# Patient Record
Sex: MALE | Race: WHITE | NOT HISPANIC OR LATINO | Employment: FULL TIME | URBAN - NONMETROPOLITAN AREA
[De-identification: names, ages, dates, MRNs, and addresses within clinical notes are randomized per-mention and may not be internally consistent; named-entity substitution may affect disease eponyms.]

---

## 2017-08-30 ENCOUNTER — HOSPITAL ENCOUNTER (EMERGENCY)
Facility: HOSPITAL | Age: 47
Discharge: HOME | End: 2017-08-30
Attending: EMERGENCY MEDICINE | Admitting: EMERGENCY MEDICINE

## 2017-08-30 ENCOUNTER — APPOINTMENT (OUTPATIENT)
Dept: CT IMAGING | Facility: HOSPITAL | Age: 47
End: 2017-08-30

## 2017-08-30 VITALS
HEART RATE: 80 BPM | WEIGHT: 270 LBS | DIASTOLIC BLOOD PRESSURE: 72 MMHG | TEMPERATURE: 97.5 F | BODY MASS INDEX: 36.57 KG/M2 | HEIGHT: 72 IN | RESPIRATION RATE: 18 BRPM | SYSTOLIC BLOOD PRESSURE: 114 MMHG | OXYGEN SATURATION: 97 %

## 2017-08-30 DIAGNOSIS — R74.8 ELEVATED LIPASE: ICD-10-CM

## 2017-08-30 DIAGNOSIS — R31.9 HEMATURIA: ICD-10-CM

## 2017-08-30 DIAGNOSIS — N20.0 KIDNEY STONE ON LEFT SIDE: Primary | ICD-10-CM

## 2017-08-30 LAB
ALBUMIN SERPL-MCNC: 4.3 G/DL (ref 3.5–5)
ALBUMIN/GLOB SERPL: 1.5 G/DL (ref 1–2)
ALP SERPL-CCNC: 88 U/L (ref 38–126)
ALT SERPL W P-5'-P-CCNC: 47 U/L (ref 13–69)
ANION GAP SERPL CALCULATED.3IONS-SCNC: 15.2 MMOL/L
AST SERPL-CCNC: 24 U/L (ref 15–46)
BACTERIA UR QL AUTO: ABNORMAL /HPF
BASOPHILS # BLD AUTO: 0.13 10*3/MM3 (ref 0–0.2)
BASOPHILS NFR BLD AUTO: 1.3 % (ref 0–2.5)
BILIRUB SERPL-MCNC: 0.5 MG/DL (ref 0.2–1.3)
BILIRUB UR QL STRIP: NEGATIVE
BUN BLD-MCNC: 15 MG/DL (ref 7–20)
BUN/CREAT SERPL: 13.6 (ref 6.3–21.9)
CALCIUM SPEC-SCNC: 9.4 MG/DL (ref 8.4–10.2)
CHLORIDE SERPL-SCNC: 105 MMOL/L (ref 98–107)
CLARITY UR: ABNORMAL
CO2 SERPL-SCNC: 28 MMOL/L (ref 26–30)
COLOR UR: YELLOW
CREAT BLD-MCNC: 1.1 MG/DL (ref 0.6–1.3)
DEPRECATED RDW RBC AUTO: 44.1 FL (ref 37–54)
EOSINOPHIL # BLD AUTO: 0.18 10*3/MM3 (ref 0–0.7)
EOSINOPHIL NFR BLD AUTO: 1.8 % (ref 0–7)
ERYTHROCYTE [DISTWIDTH] IN BLOOD BY AUTOMATED COUNT: 13.7 % (ref 11.5–14.5)
GFR SERPL CREATININE-BSD FRML MDRD: 72 ML/MIN/1.73
GLOBULIN UR ELPH-MCNC: 2.8 GM/DL
GLUCOSE BLD-MCNC: 146 MG/DL (ref 74–98)
GLUCOSE UR STRIP-MCNC: NEGATIVE MG/DL
HCT VFR BLD AUTO: 44.9 % (ref 42–52)
HGB BLD-MCNC: 14.6 G/DL (ref 14–18)
HGB UR QL STRIP.AUTO: ABNORMAL
HOLD SPECIMEN: NORMAL
HOLD SPECIMEN: NORMAL
IMM GRANULOCYTES # BLD: 0.05 10*3/MM3 (ref 0–0.06)
IMM GRANULOCYTES NFR BLD: 0.5 % (ref 0–0.6)
KETONES UR QL STRIP: NEGATIVE
LEUKOCYTE ESTERASE UR QL STRIP.AUTO: NEGATIVE
LIPASE SERPL-CCNC: 2262 U/L (ref 23–300)
LYMPHOCYTES # BLD AUTO: 2.16 10*3/MM3 (ref 0.6–3.4)
LYMPHOCYTES NFR BLD AUTO: 21.4 % (ref 10–50)
MCH RBC QN AUTO: 28.6 PG (ref 27–31)
MCHC RBC AUTO-ENTMCNC: 32.5 G/DL (ref 30–37)
MCV RBC AUTO: 88 FL (ref 80–94)
MONOCYTES # BLD AUTO: 0.69 10*3/MM3 (ref 0–0.9)
MONOCYTES NFR BLD AUTO: 6.8 % (ref 0–12)
NEUTROPHILS # BLD AUTO: 6.89 10*3/MM3 (ref 2–6.9)
NEUTROPHILS NFR BLD AUTO: 68.2 % (ref 37–80)
NITRITE UR QL STRIP: NEGATIVE
NRBC BLD MANUAL-RTO: 0 /100 WBC (ref 0–0)
PH UR STRIP.AUTO: 5.5 [PH] (ref 5–8)
PLATELET # BLD AUTO: 278 10*3/MM3 (ref 130–400)
PMV BLD AUTO: 11.4 FL (ref 6–12)
POTASSIUM BLD-SCNC: 4.2 MMOL/L (ref 3.5–5.1)
PROT SERPL-MCNC: 7.1 G/DL (ref 6.3–8.2)
PROT UR QL STRIP: ABNORMAL
RBC # BLD AUTO: 5.1 10*6/MM3 (ref 4.7–6.1)
RBC # UR: ABNORMAL /HPF
REF LAB TEST METHOD: ABNORMAL
SODIUM BLD-SCNC: 144 MMOL/L (ref 137–145)
SP GR UR STRIP: 1.02 (ref 1–1.03)
SQUAMOUS #/AREA URNS HPF: ABNORMAL /HPF
UROBILINOGEN UR QL STRIP: ABNORMAL
WBC NRBC COR # BLD: 10.1 10*3/MM3 (ref 4.8–10.8)
WBC UR QL AUTO: ABNORMAL /HPF
WHOLE BLOOD HOLD SPECIMEN: NORMAL
WHOLE BLOOD HOLD SPECIMEN: NORMAL

## 2017-08-30 PROCEDURE — 99283 EMERGENCY DEPT VISIT LOW MDM: CPT

## 2017-08-30 PROCEDURE — 96375 TX/PRO/DX INJ NEW DRUG ADDON: CPT

## 2017-08-30 PROCEDURE — 81001 URINALYSIS AUTO W/SCOPE: CPT | Performed by: EMERGENCY MEDICINE

## 2017-08-30 PROCEDURE — 80053 COMPREHEN METABOLIC PANEL: CPT | Performed by: EMERGENCY MEDICINE

## 2017-08-30 PROCEDURE — 25010000002 MORPHINE PER 10 MG: Performed by: EMERGENCY MEDICINE

## 2017-08-30 PROCEDURE — 96361 HYDRATE IV INFUSION ADD-ON: CPT

## 2017-08-30 PROCEDURE — 25010000002 KETOROLAC TROMETHAMINE PER 15 MG: Performed by: PHYSICIAN ASSISTANT

## 2017-08-30 PROCEDURE — 0 IOPAMIDOL 61 % SOLUTION: Performed by: EMERGENCY MEDICINE

## 2017-08-30 PROCEDURE — 74177 CT ABD & PELVIS W/CONTRAST: CPT

## 2017-08-30 PROCEDURE — 85025 COMPLETE CBC W/AUTO DIFF WBC: CPT | Performed by: EMERGENCY MEDICINE

## 2017-08-30 PROCEDURE — 83690 ASSAY OF LIPASE: CPT | Performed by: EMERGENCY MEDICINE

## 2017-08-30 PROCEDURE — 96374 THER/PROPH/DIAG INJ IV PUSH: CPT

## 2017-08-30 PROCEDURE — 25010000002 ONDANSETRON PER 1 MG

## 2017-08-30 RX ORDER — SODIUM CHLORIDE 0.9 % (FLUSH) 0.9 %
10 SYRINGE (ML) INJECTION AS NEEDED
Status: DISCONTINUED | OUTPATIENT
Start: 2017-08-30 | End: 2017-08-30 | Stop reason: HOSPADM

## 2017-08-30 RX ORDER — KETOROLAC TROMETHAMINE 30 MG/ML
30 INJECTION, SOLUTION INTRAMUSCULAR; INTRAVENOUS ONCE
Status: COMPLETED | OUTPATIENT
Start: 2017-08-30 | End: 2017-08-30

## 2017-08-30 RX ORDER — ATORVASTATIN CALCIUM 10 MG/1
10 TABLET, FILM COATED ORAL DAILY
COMMUNITY

## 2017-08-30 RX ORDER — ASPIRIN 81 MG/1
81 TABLET, CHEWABLE ORAL DAILY
COMMUNITY
End: 2022-04-13

## 2017-08-30 RX ORDER — ONDANSETRON 4 MG/1
4 TABLET, FILM COATED ORAL EVERY 6 HOURS PRN
Qty: 12 TABLET | Refills: 0 | Status: SHIPPED | OUTPATIENT
Start: 2017-08-30 | End: 2019-05-21

## 2017-08-30 RX ORDER — MORPHINE SULFATE 4 MG/ML
4 INJECTION, SOLUTION INTRAMUSCULAR; INTRAVENOUS ONCE
Status: COMPLETED | OUTPATIENT
Start: 2017-08-30 | End: 2017-08-30

## 2017-08-30 RX ORDER — TAMSULOSIN HYDROCHLORIDE 0.4 MG/1
1 CAPSULE ORAL NIGHTLY
Qty: 10 CAPSULE | Refills: 0 | Status: SHIPPED | OUTPATIENT
Start: 2017-08-30 | End: 2019-05-21

## 2017-08-30 RX ORDER — OXYCODONE HYDROCHLORIDE AND ACETAMINOPHEN 5; 325 MG/1; MG/1
1 TABLET ORAL EVERY 4 HOURS PRN
Qty: 12 TABLET | Refills: 0 | OUTPATIENT
Start: 2017-08-30 | End: 2019-02-27 | Stop reason: HOSPADM

## 2017-08-30 RX ORDER — ONDANSETRON 2 MG/ML
4 INJECTION INTRAMUSCULAR; INTRAVENOUS ONCE
Status: COMPLETED | OUTPATIENT
Start: 2017-08-30 | End: 2017-08-30

## 2017-08-30 RX ADMIN — IOPAMIDOL 100 ML: 612 INJECTION, SOLUTION INTRAVENOUS at 11:34

## 2017-08-30 RX ADMIN — MORPHINE SULFATE 4 MG: 4 INJECTION, SOLUTION INTRAMUSCULAR; INTRAVENOUS at 11:47

## 2017-08-30 RX ADMIN — SODIUM CHLORIDE 1000 ML: 9 INJECTION, SOLUTION INTRAVENOUS at 09:33

## 2017-08-30 RX ADMIN — KETOROLAC TROMETHAMINE 30 MG: 30 INJECTION, SOLUTION INTRAMUSCULAR at 10:14

## 2017-08-30 RX ADMIN — ONDANSETRON 4 MG: 2 INJECTION INTRAMUSCULAR; INTRAVENOUS at 09:33

## 2017-09-27 ENCOUNTER — HOSPITAL ENCOUNTER (OUTPATIENT)
Dept: GENERAL RADIOLOGY | Facility: HOSPITAL | Age: 47
Discharge: HOME | End: 2017-09-27
Admitting: NURSE PRACTITIONER

## 2017-09-27 ENCOUNTER — TRANSCRIBE ORDERS (OUTPATIENT)
Dept: ADMINISTRATIVE | Facility: HOSPITAL | Age: 47
End: 2017-09-27

## 2017-09-27 DIAGNOSIS — R05.9 COUGH: Primary | ICD-10-CM

## 2017-09-27 PROCEDURE — 71020 HC CHEST PA AND LATERAL: CPT

## 2017-10-02 ENCOUNTER — HOSPITAL ENCOUNTER (EMERGENCY)
Facility: HOSPITAL | Age: 47
Discharge: HOME | End: 2017-10-02
Attending: EMERGENCY MEDICINE | Admitting: EMERGENCY MEDICINE

## 2017-10-02 VITALS
RESPIRATION RATE: 16 BRPM | HEIGHT: 72 IN | OXYGEN SATURATION: 96 % | SYSTOLIC BLOOD PRESSURE: 130 MMHG | TEMPERATURE: 98.2 F | BODY MASS INDEX: 35.21 KG/M2 | WEIGHT: 260 LBS | DIASTOLIC BLOOD PRESSURE: 80 MMHG | HEART RATE: 95 BPM

## 2017-10-02 DIAGNOSIS — Z72.0 TOBACCO ABUSE: ICD-10-CM

## 2017-10-02 DIAGNOSIS — J06.9 VIRAL URI WITH COUGH: Primary | ICD-10-CM

## 2017-10-02 LAB
FLUAV AG NPH QL: NEGATIVE
FLUBV AG NPH QL IA: NEGATIVE

## 2017-10-02 PROCEDURE — 99283 EMERGENCY DEPT VISIT LOW MDM: CPT

## 2017-10-02 PROCEDURE — 87804 INFLUENZA ASSAY W/OPTIC: CPT | Performed by: EMERGENCY MEDICINE

## 2017-10-02 NOTE — ED PROVIDER NOTES
Subjective   History of Present Illness  TRIAGE CHIEF COMPLAINT:   Chief Complaint   Patient presents with   • Flu Symptoms         HPI: Rupert Briscoe   is a 46 y.o. male   who presents to the emergency department complaining of Flulike illness.  Patient with history of hypertension, diabetes, dyslipidemia, active smoker.  Describes 7-8 days of flulike illness with body aches, nasal congestion, intermittent cough, fatigue.  Had outpatient chest x-ray and flu swab earlier this week which were unremarkable.  He finished a course of azithromycin yesterday.  Patient states he does not feel adequately improved.  Denies any nausea, vomiting, diarrhea, or measured fever within the past 24 hours.  Patient states his glucose has been well-controlled.           Review of Systems   All other systems reviewed and are negative.      Past Medical History:   Diagnosis Date   • Diabetes mellitus    • Hyperlipidemia    • Hypertension        No Known Allergies    Past Surgical History:   Procedure Laterality Date   • APPENDECTOMY     • CHOLECYSTECTOMY     • COLON SURGERY     • HEMORRHOIDECTOMY     • HERNIA REPAIR     • MANDIBLE FRACTURE SURGERY     • TONSILLECTOMY         History reviewed. No pertinent family history.    Social History     Social History   • Marital status: Single     Spouse name: N/A   • Number of children: N/A   • Years of education: N/A     Social History Main Topics   • Smoking status: Current Every Day Smoker     Packs/day: 0.50   • Smokeless tobacco: None   • Alcohol use No   • Drug use: No   • Sexual activity: Defer     Other Topics Concern   • None     Social History Narrative   • None           Objective   Physical Exam    CONSTITUTIONAL: Awake, oriented, appears non-toxic   HENT: Atraumatic, normocephalic, oral mucosa pink and moist, airway patent. Nares patent without drainage. External ears normal.   EYES: Conjunctiva clear, EOMI, PERRL   NECK: Trachea midline, non-tender, supple   CARDIOVASCULAR:  Normal heart rate 84, Normal rhythm, No murmurs, rubs, gallops   PULMONARY/CHEST: Clear to auscultation, no rhonchi, wheezes, or rales. Symmetrical breath sounds. Non-tender.  No coughing observed  ABDOMINAL: Non-distended, soft, non-tender - no rebound or guarding. BS normal.   NEUROLOGIC: Non-focal, moving all four extremities, no gross sensory or motor deficits.   EXTREMITIES: No clubbing, cyanosis, or edema   SKIN: Warm, Dry, No erythema, No rash     No orders to display           EKG:         Procedures         ED Course  ED Course          ED COURSE / MEDICAL DECISION MAKING:   Nursing notes reviewed.    Patient is well-appearing, moving good air without wheezing, observed coughing, rhonchi.  He is also afebrile.  Overall symptoms are most suggestive of a viral URI and I suspect this is why he did not respond to azithromycin.  Plan for supportive measures, encouraged to quit smoking, outpatient PCP follow-up versus return if worse    DECISION TO DISCHARGE/ADMIT: see ED care timeline       Electronically signed by: Vladimir Donohue MD, 10/2/2017 9:33 AM              [facility]    Final diagnoses:   Viral URI with cough   Tobacco abuse            Vladimir Donohue MD  10/02/17 0934

## 2017-11-27 ENCOUNTER — APPOINTMENT (OUTPATIENT)
Dept: CT IMAGING | Facility: HOSPITAL | Age: 47
End: 2017-11-27

## 2017-11-27 ENCOUNTER — APPOINTMENT (OUTPATIENT)
Dept: GENERAL RADIOLOGY | Facility: HOSPITAL | Age: 47
End: 2017-11-27

## 2017-11-27 ENCOUNTER — HOSPITAL ENCOUNTER (EMERGENCY)
Facility: HOSPITAL | Age: 47
Discharge: HOME | End: 2017-11-27
Attending: EMERGENCY MEDICINE | Admitting: EMERGENCY MEDICINE

## 2017-11-27 VITALS
HEART RATE: 69 BPM | SYSTOLIC BLOOD PRESSURE: 137 MMHG | WEIGHT: 260 LBS | HEIGHT: 72 IN | TEMPERATURE: 97.8 F | OXYGEN SATURATION: 98 % | RESPIRATION RATE: 20 BRPM | BODY MASS INDEX: 35.21 KG/M2 | DIASTOLIC BLOOD PRESSURE: 87 MMHG

## 2017-11-27 DIAGNOSIS — K59.00 CONSTIPATION, UNSPECIFIED CONSTIPATION TYPE: ICD-10-CM

## 2017-11-27 DIAGNOSIS — J06.9 UPPER RESPIRATORY TRACT INFECTION, UNSPECIFIED TYPE: ICD-10-CM

## 2017-11-27 DIAGNOSIS — R05.9 COUGH: Primary | ICD-10-CM

## 2017-11-27 LAB
ALBUMIN SERPL-MCNC: 4.1 G/DL (ref 3.5–5)
ALBUMIN/GLOB SERPL: 1.6 G/DL (ref 1–2)
ALP SERPL-CCNC: 75 U/L (ref 38–126)
ALT SERPL W P-5'-P-CCNC: 40 U/L (ref 13–69)
ANION GAP SERPL CALCULATED.3IONS-SCNC: 15.2 MMOL/L
AST SERPL-CCNC: 20 U/L (ref 15–46)
BASOPHILS # BLD AUTO: 0.12 10*3/MM3 (ref 0–0.2)
BASOPHILS NFR BLD AUTO: 1.5 % (ref 0–2.5)
BILIRUB SERPL-MCNC: 0.2 MG/DL (ref 0.2–1.3)
BUN BLD-MCNC: 17 MG/DL (ref 7–20)
BUN/CREAT SERPL: 17 (ref 6.3–21.9)
CALCIUM SPEC-SCNC: 9.1 MG/DL (ref 8.4–10.2)
CHLORIDE SERPL-SCNC: 103 MMOL/L (ref 98–107)
CO2 SERPL-SCNC: 28 MMOL/L (ref 26–30)
CREAT BLD-MCNC: 1 MG/DL (ref 0.6–1.3)
DEPRECATED RDW RBC AUTO: 44.1 FL (ref 37–54)
EOSINOPHIL # BLD AUTO: 0.36 10*3/MM3 (ref 0–0.7)
EOSINOPHIL NFR BLD AUTO: 4.6 % (ref 0–7)
ERYTHROCYTE [DISTWIDTH] IN BLOOD BY AUTOMATED COUNT: 13.4 % (ref 11.5–14.5)
FLUAV AG NPH QL: NEGATIVE
FLUBV AG NPH QL IA: NEGATIVE
GFR SERPL CREATININE-BSD FRML MDRD: 80 ML/MIN/1.73
GLOBULIN UR ELPH-MCNC: 2.6 GM/DL
GLUCOSE BLD-MCNC: 153 MG/DL (ref 74–98)
HCT VFR BLD AUTO: 43.9 % (ref 42–52)
HGB BLD-MCNC: 13.8 G/DL (ref 14–18)
IMM GRANULOCYTES # BLD: 0.03 10*3/MM3 (ref 0–0.06)
IMM GRANULOCYTES NFR BLD: 0.4 % (ref 0–0.6)
LYMPHOCYTES # BLD AUTO: 2.37 10*3/MM3 (ref 0.6–3.4)
LYMPHOCYTES NFR BLD AUTO: 30.5 % (ref 10–50)
MCH RBC QN AUTO: 28.4 PG (ref 27–31)
MCHC RBC AUTO-ENTMCNC: 31.4 G/DL (ref 30–37)
MCV RBC AUTO: 90.3 FL (ref 80–94)
MONOCYTES # BLD AUTO: 0.71 10*3/MM3 (ref 0–0.9)
MONOCYTES NFR BLD AUTO: 9.1 % (ref 0–12)
NEUTROPHILS # BLD AUTO: 4.19 10*3/MM3 (ref 2–6.9)
NEUTROPHILS NFR BLD AUTO: 53.9 % (ref 37–80)
NRBC BLD MANUAL-RTO: 0 /100 WBC (ref 0–0)
NT-PROBNP SERPL-MCNC: 29.9 PG/ML (ref 0–125)
PLATELET # BLD AUTO: 240 10*3/MM3 (ref 130–400)
PMV BLD AUTO: 10.7 FL (ref 6–12)
POTASSIUM BLD-SCNC: 4.2 MMOL/L (ref 3.5–5.1)
PROT SERPL-MCNC: 6.7 G/DL (ref 6.3–8.2)
RBC # BLD AUTO: 4.86 10*6/MM3 (ref 4.7–6.1)
SODIUM BLD-SCNC: 142 MMOL/L (ref 137–145)
TROPONIN I SERPL-MCNC: <0.012 NG/ML (ref 0–0.03)
WBC NRBC COR # BLD: 7.78 10*3/MM3 (ref 4.8–10.8)

## 2017-11-27 PROCEDURE — 80053 COMPREHEN METABOLIC PANEL: CPT | Performed by: EMERGENCY MEDICINE

## 2017-11-27 PROCEDURE — 93005 ELECTROCARDIOGRAM TRACING: CPT | Performed by: EMERGENCY MEDICINE

## 2017-11-27 PROCEDURE — 85025 COMPLETE CBC W/AUTO DIFF WBC: CPT | Performed by: EMERGENCY MEDICINE

## 2017-11-27 PROCEDURE — 0 IOPAMIDOL 61 % SOLUTION: Performed by: EMERGENCY MEDICINE

## 2017-11-27 PROCEDURE — 71020 HC CHEST PA AND LATERAL: CPT

## 2017-11-27 PROCEDURE — 96374 THER/PROPH/DIAG INJ IV PUSH: CPT

## 2017-11-27 PROCEDURE — 25010000002 KETOROLAC TROMETHAMINE PER 15 MG: Performed by: EMERGENCY MEDICINE

## 2017-11-27 PROCEDURE — 83880 ASSAY OF NATRIURETIC PEPTIDE: CPT | Performed by: EMERGENCY MEDICINE

## 2017-11-27 PROCEDURE — 96361 HYDRATE IV INFUSION ADD-ON: CPT

## 2017-11-27 PROCEDURE — 99284 EMERGENCY DEPT VISIT MOD MDM: CPT

## 2017-11-27 PROCEDURE — 74177 CT ABD & PELVIS W/CONTRAST: CPT

## 2017-11-27 PROCEDURE — 84484 ASSAY OF TROPONIN QUANT: CPT | Performed by: EMERGENCY MEDICINE

## 2017-11-27 PROCEDURE — 87804 INFLUENZA ASSAY W/OPTIC: CPT | Performed by: EMERGENCY MEDICINE

## 2017-11-27 RX ORDER — BENZONATATE 100 MG/1
100 CAPSULE ORAL 3 TIMES DAILY PRN
Qty: 20 CAPSULE | Refills: 0 | OUTPATIENT
Start: 2017-11-27 | End: 2019-02-27 | Stop reason: HOSPADM

## 2017-11-27 RX ORDER — KETOROLAC TROMETHAMINE 30 MG/ML
30 INJECTION, SOLUTION INTRAMUSCULAR; INTRAVENOUS ONCE
Status: COMPLETED | OUTPATIENT
Start: 2017-11-27 | End: 2017-11-27

## 2017-11-27 RX ORDER — POLYETHYLENE GLYCOL 3350 17 G/17G
17 POWDER, FOR SOLUTION ORAL DAILY PRN
Qty: 30 EACH | Refills: 0 | OUTPATIENT
Start: 2017-11-27 | End: 2019-02-27 | Stop reason: HOSPADM

## 2017-11-27 RX ORDER — BENZONATATE 100 MG/1
100 CAPSULE ORAL ONCE
Status: COMPLETED | OUTPATIENT
Start: 2017-11-27 | End: 2017-11-27

## 2017-11-27 RX ADMIN — IOPAMIDOL 100 ML: 612 INJECTION, SOLUTION INTRAVENOUS at 06:41

## 2017-11-27 RX ADMIN — BENZONATATE 100 MG: 100 CAPSULE ORAL at 05:38

## 2017-11-27 RX ADMIN — KETOROLAC TROMETHAMINE 30 MG: 30 INJECTION, SOLUTION INTRAMUSCULAR at 05:36

## 2017-11-27 RX ADMIN — SODIUM CHLORIDE 1000 ML: 9 INJECTION, SOLUTION INTRAVENOUS at 05:34

## 2018-01-27 ENCOUNTER — HOSPITAL ENCOUNTER (EMERGENCY)
Facility: HOSPITAL | Age: 48
Discharge: HOME | End: 2018-01-27
Attending: EMERGENCY MEDICINE | Admitting: EMERGENCY MEDICINE

## 2018-01-27 VITALS
DIASTOLIC BLOOD PRESSURE: 95 MMHG | RESPIRATION RATE: 18 BRPM | SYSTOLIC BLOOD PRESSURE: 138 MMHG | OXYGEN SATURATION: 97 % | HEART RATE: 97 BPM | TEMPERATURE: 98.2 F | WEIGHT: 270 LBS | HEIGHT: 72 IN | BODY MASS INDEX: 36.57 KG/M2

## 2018-01-27 DIAGNOSIS — R53.83 OTHER FATIGUE: Primary | ICD-10-CM

## 2018-01-27 LAB
ALBUMIN SERPL-MCNC: 4.2 G/DL (ref 3.5–5)
ALBUMIN/GLOB SERPL: 1.7 G/DL (ref 1–2)
ALP SERPL-CCNC: 60 U/L (ref 38–126)
ALT SERPL W P-5'-P-CCNC: 39 U/L (ref 13–69)
ANION GAP SERPL CALCULATED.3IONS-SCNC: 13.4 MMOL/L
AST SERPL-CCNC: 21 U/L (ref 15–46)
BASOPHILS # BLD AUTO: 0.1 10*3/MM3 (ref 0–0.2)
BASOPHILS NFR BLD AUTO: 1.2 % (ref 0–2.5)
BILIRUB SERPL-MCNC: 0.2 MG/DL (ref 0.2–1.3)
BUN BLD-MCNC: 21 MG/DL (ref 7–20)
BUN/CREAT SERPL: 19.1 (ref 6.3–21.9)
CALCIUM SPEC-SCNC: 9.5 MG/DL (ref 8.4–10.2)
CHLORIDE SERPL-SCNC: 104 MMOL/L (ref 98–107)
CO2 SERPL-SCNC: 30 MMOL/L (ref 26–30)
CREAT BLD-MCNC: 1.1 MG/DL (ref 0.6–1.3)
DEPRECATED RDW RBC AUTO: 42.5 FL (ref 37–54)
EOSINOPHIL # BLD AUTO: 0.17 10*3/MM3 (ref 0–0.7)
EOSINOPHIL NFR BLD AUTO: 2 % (ref 0–7)
ERYTHROCYTE [DISTWIDTH] IN BLOOD BY AUTOMATED COUNT: 12.9 % (ref 11.5–14.5)
GFR SERPL CREATININE-BSD FRML MDRD: 71 ML/MIN/1.73
GLOBULIN UR ELPH-MCNC: 2.5 GM/DL
GLUCOSE BLD-MCNC: 103 MG/DL (ref 74–98)
HCT VFR BLD AUTO: 40 % (ref 42–52)
HGB BLD-MCNC: 13 G/DL (ref 14–18)
IMM GRANULOCYTES # BLD: 0.03 10*3/MM3 (ref 0–0.06)
IMM GRANULOCYTES NFR BLD: 0.3 % (ref 0–0.6)
LYMPHOCYTES # BLD AUTO: 2.85 10*3/MM3 (ref 0.6–3.4)
LYMPHOCYTES NFR BLD AUTO: 33.1 % (ref 10–50)
MCH RBC QN AUTO: 29.1 PG (ref 27–31)
MCHC RBC AUTO-ENTMCNC: 32.5 G/DL (ref 30–37)
MCV RBC AUTO: 89.5 FL (ref 80–94)
MONOCYTES # BLD AUTO: 0.67 10*3/MM3 (ref 0–0.9)
MONOCYTES NFR BLD AUTO: 7.8 % (ref 0–12)
NEUTROPHILS # BLD AUTO: 4.78 10*3/MM3 (ref 2–6.9)
NEUTROPHILS NFR BLD AUTO: 55.6 % (ref 37–80)
NRBC BLD MANUAL-RTO: 0 /100 WBC (ref 0–0)
PLATELET # BLD AUTO: 262 10*3/MM3 (ref 130–400)
PMV BLD AUTO: 9.7 FL (ref 6–12)
POTASSIUM BLD-SCNC: 4.4 MMOL/L (ref 3.5–5.1)
PROT SERPL-MCNC: 6.7 G/DL (ref 6.3–8.2)
RBC # BLD AUTO: 4.47 10*6/MM3 (ref 4.7–6.1)
SODIUM BLD-SCNC: 143 MMOL/L (ref 137–145)
TROPONIN I SERPL-MCNC: <0.012 NG/ML (ref 0–0.03)
WBC NRBC COR # BLD: 8.6 10*3/MM3 (ref 4.8–10.8)

## 2018-01-27 PROCEDURE — 84484 ASSAY OF TROPONIN QUANT: CPT | Performed by: PHYSICIAN ASSISTANT

## 2018-01-27 PROCEDURE — 93005 ELECTROCARDIOGRAM TRACING: CPT | Performed by: PHYSICIAN ASSISTANT

## 2018-01-27 PROCEDURE — 99283 EMERGENCY DEPT VISIT LOW MDM: CPT

## 2018-01-27 PROCEDURE — 80053 COMPREHEN METABOLIC PANEL: CPT | Performed by: PHYSICIAN ASSISTANT

## 2018-01-27 PROCEDURE — 36415 COLL VENOUS BLD VENIPUNCTURE: CPT

## 2018-01-27 PROCEDURE — 85025 COMPLETE CBC W/AUTO DIFF WBC: CPT | Performed by: PHYSICIAN ASSISTANT

## 2018-01-27 NOTE — ED PROVIDER NOTES
Subjective   HPI Comments: 47-year-old male in the ER with his wife the patient is complaining of generalized fatigue while at work today.  She has a history of diabetes and sleep apnea.  His wife states he is very noncompliant with his treatment.  He only takes his medications when he feels like it and misses up to a week at a time.  He also has a history of drinking viper energy drinks every morning and then 3-4 while at work. Has not had any today.  He denies any chest pain or difficulty breathing.  There is been no recent vomiting or diarrhea.  He denies any fever, cold symptoms.  He does have urinary frequency but does not have any complaints of dysuria or hematuria.  He is complaining of a numb sensation to both his legs.  He was able to walk into the ER unassisted.  Denies any back pain.  There is no history of coronary artery disease, he states he had a stress test that was normal about a year and a half ago.  He does not know his family history as he was adopted.  His wife reports he has an 11 pound weight gain over the last 2 months, he has started a new job that is sedentary.  His previous job he walked the entire shift.  His doctor is at [facility].      History provided by:  Patient and spouse  History limited by: no limits.   used: No        Review of Systems   Constitutional: Negative for activity change, appetite change, diaphoresis, fatigue and fever.   HENT: Negative for congestion, ear pain, rhinorrhea, sneezing and sore throat.    Eyes: Negative for pain, discharge and redness.   Respiratory: Negative for cough, shortness of breath and wheezing.    Cardiovascular: Negative.    Gastrointestinal: Negative for abdominal pain, constipation, diarrhea, nausea and vomiting.   Endocrine: Positive for polyuria.   Genitourinary: Negative for decreased urine volume, difficulty urinating, dysuria, frequency, hematuria and urgency.   Musculoskeletal: Negative for back pain and neck  pain.   Skin: Negative for color change, pallor and rash.   Allergic/Immunologic: Negative.    Neurological: Positive for numbness.   Hematological: Negative.    Psychiatric/Behavioral: Negative.    All other systems reviewed and are negative.      Past Medical History:   Diagnosis Date   • Diabetes mellitus    • Hyperlipidemia    • Hypertension        No Known Allergies    Past Surgical History:   Procedure Laterality Date   • APPENDECTOMY     • CHOLECYSTECTOMY     • COLON SURGERY     • HEMORRHOIDECTOMY     • HERNIA REPAIR     • MANDIBLE FRACTURE SURGERY     • TONSILLECTOMY         History reviewed. No pertinent family history.    Social History     Social History   • Marital status: Single     Spouse name: N/A   • Number of children: N/A   • Years of education: N/A     Social History Main Topics   • Smoking status: Current Every Day Smoker     Packs/day: 0.50   • Smokeless tobacco: None   • Alcohol use No   • Drug use: No   • Sexual activity: Defer     Other Topics Concern   • None     Social History Narrative           Objective   Physical Exam   Constitutional: He is oriented to person, place, and time. He appears well-developed and well-nourished. No distress.   HENT:   Head: Normocephalic and atraumatic.   Right Ear: External ear normal.   Left Ear: External ear normal.   Nose: Nose normal.   Mouth/Throat: Oropharynx is clear and moist. No oropharyngeal exudate.   Eyes: Conjunctivae and EOM are normal. Pupils are equal, round, and reactive to light. Right eye exhibits no discharge. Left eye exhibits no discharge. No scleral icterus.   Neck: Normal range of motion. Neck supple. No JVD present. No tracheal deviation present.   Cardiovascular: Normal rate, regular rhythm and normal heart sounds.    Pulmonary/Chest: Effort normal and breath sounds normal. No stridor. No respiratory distress. He has no wheezes. He has no rales.   Abdominal: Soft. Bowel sounds are normal. He exhibits no mass. There is no tenderness.  There is no rebound and no guarding. No hernia.   Musculoskeletal: Normal range of motion. He exhibits no edema, tenderness or deformity.   Lymphadenopathy:     He has no cervical adenopathy.   Neurological: He is alert and oriented to person, place, and time. No cranial nerve deficit. He exhibits normal muscle tone. Coordination normal.   Skin: Skin is warm and dry. No rash noted. He is not diaphoretic. No erythema. No pallor.   Psychiatric: He has a normal mood and affect. His behavior is normal. Judgment and thought content normal.   Nursing note and vitals reviewed.      ECG 12 Lead    Date/Time: 1/27/2018 8:04 PM  Performed by: CECE STEARNS  Authorized by: CECE STEARNS   Interpreted by physician  Comparison: compared with previous ECG from 11/27/2017  Rhythm: sinus rhythm  Rate: normal  QRS axis: normal  Conduction: conduction normal  ST Segments: ST segments normal  T Waves: T waves normal  Other: no other findings  Clinical impression: normal ECG               ED Course  ED Course   Comment By Time   CBC white count 8.6, Hemoglobin 13, hematocrit 40, platelets 262. Cece Stearns PA-C 01/27 2014   Pt states is feeling better and anxious to get home.  Discussed work up with him, exact cause of fatigue not revealed in labs, advised pt to see PCP on Monday for further evaluation.  QUESTIONS answered to his satisfaction, he is agreeable to the treatment plan and be discharged home. Cece Stearns PA-C 01/27 2035   Comprehensive metabolic panel, glucose 103, BUN 21, creatinine 1.1, sodium 143, potassium 4.4, chloride 104, CO2 30.  Troponin less than 0.012. Cece Stearns PA-C 01/27 2036                  [facility]    Final diagnoses:   Other fatigue            Cece Stearns PA-C  01/27/18 2247

## 2018-01-28 NOTE — DISCHARGE INSTRUCTIONS
Drink plenty of fluids.  Rest.  Take all of your medications as directed.  Call your primary care physician on Monday morning and arrange a follow-up appointment for further evaluation.  Return to the ER over the weekend for any chest pain, difficulty breathing, vomiting, abdominal pain or any other worrisome changes.

## 2019-02-19 ENCOUNTER — HOSPITAL ENCOUNTER (EMERGENCY)
Facility: HOSPITAL | Age: 49
Discharge: HOME | End: 2019-02-19
Attending: EMERGENCY MEDICINE | Admitting: EMERGENCY MEDICINE

## 2019-02-19 ENCOUNTER — APPOINTMENT (OUTPATIENT)
Dept: CT IMAGING | Facility: HOSPITAL | Age: 49
End: 2019-02-19

## 2019-02-19 VITALS
TEMPERATURE: 97.8 F | SYSTOLIC BLOOD PRESSURE: 124 MMHG | HEART RATE: 71 BPM | OXYGEN SATURATION: 99 % | RESPIRATION RATE: 16 BRPM | WEIGHT: 259.4 LBS | DIASTOLIC BLOOD PRESSURE: 80 MMHG | HEIGHT: 72 IN | BODY MASS INDEX: 35.13 KG/M2

## 2019-02-19 DIAGNOSIS — R10.10 PAIN OF UPPER ABDOMEN: Primary | ICD-10-CM

## 2019-02-19 DIAGNOSIS — R74.8 ELEVATED LIPASE: ICD-10-CM

## 2019-02-19 LAB
ALBUMIN SERPL-MCNC: 3.8 G/DL (ref 3.5–5)
ALBUMIN/GLOB SERPL: 1.7 G/DL (ref 1–2)
ALP SERPL-CCNC: 63 U/L (ref 38–126)
ALT SERPL W P-5'-P-CCNC: 38 U/L (ref 13–69)
ANION GAP SERPL CALCULATED.3IONS-SCNC: 7.9 MMOL/L (ref 10–20)
AST SERPL-CCNC: 22 U/L (ref 15–46)
BASOPHILS # BLD AUTO: 0.14 10*3/MM3 (ref 0–0.2)
BASOPHILS NFR BLD AUTO: 1.6 % (ref 0–2.5)
BILIRUB SERPL-MCNC: 0.2 MG/DL (ref 0.2–1.3)
BILIRUB UR QL STRIP: NEGATIVE
BUN BLD-MCNC: 19 MG/DL (ref 7–20)
BUN/CREAT SERPL: 17.3 (ref 6.3–21.9)
CALCIUM SPEC-SCNC: 8.8 MG/DL (ref 8.4–10.2)
CHLORIDE SERPL-SCNC: 103 MMOL/L (ref 98–107)
CLARITY UR: CLEAR
CO2 SERPL-SCNC: 31 MMOL/L (ref 26–30)
COLOR UR: YELLOW
CREAT BLD-MCNC: 1.1 MG/DL (ref 0.6–1.3)
DEPRECATED RDW RBC AUTO: 41.9 FL (ref 37–54)
EOSINOPHIL # BLD AUTO: 0.22 10*3/MM3 (ref 0–0.7)
EOSINOPHIL NFR BLD AUTO: 2.5 % (ref 0–7)
ERYTHROCYTE [DISTWIDTH] IN BLOOD BY AUTOMATED COUNT: 12.8 % (ref 11.5–14.5)
GFR SERPL CREATININE-BSD FRML MDRD: 71 ML/MIN/1.73
GLOBULIN UR ELPH-MCNC: 2.2 GM/DL
GLUCOSE BLD-MCNC: 138 MG/DL (ref 74–98)
GLUCOSE UR STRIP-MCNC: NEGATIVE MG/DL
HCT VFR BLD AUTO: 43 % (ref 42–52)
HGB BLD-MCNC: 14.2 G/DL (ref 14–18)
HGB UR QL STRIP.AUTO: NEGATIVE
IMM GRANULOCYTES # BLD AUTO: 0.03 10*3/MM3 (ref 0–0.06)
IMM GRANULOCYTES NFR BLD AUTO: 0.3 % (ref 0–0.6)
KETONES UR QL STRIP: ABNORMAL
LEUKOCYTE ESTERASE UR QL STRIP.AUTO: NEGATIVE
LIPASE SERPL-CCNC: 498 U/L (ref 23–300)
LYMPHOCYTES # BLD AUTO: 2.54 10*3/MM3 (ref 0.6–3.4)
LYMPHOCYTES NFR BLD AUTO: 29.2 % (ref 10–50)
MCH RBC QN AUTO: 29.5 PG (ref 27–31)
MCHC RBC AUTO-ENTMCNC: 33 G/DL (ref 30–37)
MCV RBC AUTO: 89.4 FL (ref 80–94)
MONOCYTES # BLD AUTO: 0.6 10*3/MM3 (ref 0–0.9)
MONOCYTES NFR BLD AUTO: 6.9 % (ref 0–12)
NEUTROPHILS # BLD AUTO: 5.17 10*3/MM3 (ref 2–6.9)
NEUTROPHILS NFR BLD AUTO: 59.5 % (ref 37–80)
NITRITE UR QL STRIP: NEGATIVE
NRBC BLD AUTO-RTO: 0 /100 WBC (ref 0–0)
PH UR STRIP.AUTO: 7 [PH] (ref 5–8)
PLATELET # BLD AUTO: 289 10*3/MM3 (ref 130–400)
PMV BLD AUTO: 9.9 FL (ref 6–12)
POTASSIUM BLD-SCNC: 3.9 MMOL/L (ref 3.5–5.1)
PROT SERPL-MCNC: 6 G/DL (ref 6.3–8.2)
PROT UR QL STRIP: NEGATIVE
RBC # BLD AUTO: 4.81 10*6/MM3 (ref 4.7–6.1)
SODIUM BLD-SCNC: 138 MMOL/L (ref 137–145)
SP GR UR STRIP: 1.02 (ref 1–1.03)
UROBILINOGEN UR QL STRIP: ABNORMAL
WBC NRBC COR # BLD: 8.7 10*3/MM3 (ref 4.8–10.8)

## 2019-02-19 PROCEDURE — 96376 TX/PRO/DX INJ SAME DRUG ADON: CPT

## 2019-02-19 PROCEDURE — 96361 HYDRATE IV INFUSION ADD-ON: CPT

## 2019-02-19 PROCEDURE — 96374 THER/PROPH/DIAG INJ IV PUSH: CPT

## 2019-02-19 PROCEDURE — 74176 CT ABD & PELVIS W/O CONTRAST: CPT

## 2019-02-19 PROCEDURE — 25010000002 ONDANSETRON PER 1 MG: Performed by: PHYSICIAN ASSISTANT

## 2019-02-19 PROCEDURE — 99283 EMERGENCY DEPT VISIT LOW MDM: CPT

## 2019-02-19 PROCEDURE — 81003 URINALYSIS AUTO W/O SCOPE: CPT | Performed by: PHYSICIAN ASSISTANT

## 2019-02-19 PROCEDURE — 85025 COMPLETE CBC W/AUTO DIFF WBC: CPT | Performed by: PHYSICIAN ASSISTANT

## 2019-02-19 PROCEDURE — 80053 COMPREHEN METABOLIC PANEL: CPT | Performed by: PHYSICIAN ASSISTANT

## 2019-02-19 PROCEDURE — 83690 ASSAY OF LIPASE: CPT | Performed by: PHYSICIAN ASSISTANT

## 2019-02-19 RX ORDER — ONDANSETRON 2 MG/ML
4 INJECTION INTRAMUSCULAR; INTRAVENOUS ONCE
Status: COMPLETED | OUTPATIENT
Start: 2019-02-19 | End: 2019-02-19

## 2019-02-19 RX ORDER — ONDANSETRON 4 MG/1
4 TABLET, ORALLY DISINTEGRATING ORAL EVERY 6 HOURS PRN
Qty: 8 TABLET | Refills: 0 | Status: SHIPPED | OUTPATIENT
Start: 2019-02-19 | End: 2019-02-21

## 2019-02-19 RX ORDER — SODIUM CHLORIDE 0.9 % (FLUSH) 0.9 %
10 SYRINGE (ML) INJECTION AS NEEDED
Status: DISCONTINUED | OUTPATIENT
Start: 2019-02-19 | End: 2019-02-19 | Stop reason: HOSPADM

## 2019-02-19 RX ADMIN — ONDANSETRON 4 MG: 2 INJECTION INTRAMUSCULAR; INTRAVENOUS at 18:28

## 2019-02-19 RX ADMIN — SODIUM CHLORIDE 1000 ML: 9 INJECTION, SOLUTION INTRAVENOUS at 18:26

## 2019-02-19 RX ADMIN — ONDANSETRON 4 MG: 2 INJECTION INTRAMUSCULAR; INTRAVENOUS at 20:09

## 2019-02-19 NOTE — ED PROVIDER NOTES
Subjective   Patient is a 48-year-old male with history of diabetes, hyperlipidemia, hypertension, and diverticulitis who presents to the ED for evaluation of abdominal pain.  Patient states she has been having worsening abdominal pain for the past 5 days; he states it is mostly located in the left upper and lower quadrants.  He states he was seen by his PCP today and was told to come to the ED if pain worsened.  He describes the pain as poking in nature.  He states he is also had a headache but was also diagnosed with influenza A and B last week.  He states he has had intermittent diarrhea that is watery and loose in nature without blood.  His last bowel movement was earlier today and he did notice some mucous.  He states he has had chills and nausea.  Denies any document of fever, neck pain or stiffness, ear or throat pain, chest pain, difficulty breathing, cough, emesis, hematuria, dysuria, penile discharge, scrotal pain or swelling, melena, hematochezia, or any other symptoms.  He denies any recent antibiotic use, travel, hospitalization.            Review of Systems   All other systems reviewed and are negative.      Past Medical History:   Diagnosis Date   • Diabetes mellitus (CMS/HCC)    • Hyperlipidemia    • Hypertension        No Known Allergies    Past Surgical History:   Procedure Laterality Date   • APPENDECTOMY     • CHOLECYSTECTOMY     • COLON SURGERY     • HEMORRHOIDECTOMY     • HERNIA REPAIR     • MANDIBLE FRACTURE SURGERY     • TONSILLECTOMY         History reviewed. No pertinent family history.    Social History     Socioeconomic History   • Marital status: Single     Spouse name: Not on file   • Number of children: Not on file   • Years of education: Not on file   • Highest education level: Not on file   Tobacco Use   • Smoking status: Current Every Day Smoker     Packs/day: 0.50   Substance and Sexual Activity   • Alcohol use: No   • Drug use: No   • Sexual activity: Defer           Objective    Physical Exam   Nursing note and vitals reviewed.    GEN: No acute distress, eating up in the stretcher.  Awake, alert, speaking in full sentences.  He does not appear toxic  Head: Normocephalic, atraumatic  Eyes: Pupils equal round reactive to light, EOM intact  ENT: Posterior pharynx normal in appearance, oral mucosa is moist, tongue midline   Chest: Nontender to palpation  Cardiovascular: Regular rate and rhythm  Lungs: Clear to auscultation bilaterally without adventitious sounds or wheezing  Abdomen: Nondistended.  Bowel sounds are present all 4 quadrants.  Soft, tender to palpation in epigastric, left upper and lower left quadrants, no guarding  Extremities: No edema, normal appearance, full ROM.  Radial and posterior tibialis pulses are 2+ and equal bilaterally.  Neuro: GCS 15  Psych: Mood and affect are appropriate     Procedures           ED Course  ED Course as of Feb 19 2030 Tue Feb 19, 2019 2015 RN just informed me that patient was given cipro and flagyl by PCP today. He is tolerating PO intake. Discussed with Dr. Saunders. Believe is appropriate for discharge at this time with close follow up and return precautions.   [LA]      ED Course User Index  [LA] Keily Dominguez PA-C                  MDM  Number of Diagnoses or Management Options  Elevated lipase:   Pain of upper abdomen:   Diagnosis management comments: On arrival, patient is afebrile, nontoxic in appearance, no acute distress.  Differential includes viral illness, colitis, pancreatitis, diverticulitis, dehydration, nephrolithiasis, urinary tract infection, and other concerns.  No concerns for GI bleed or infectious diarrhea given patient has not noticed melena or hematochezia.  Will obtain basic labs, urine and CT scan to further evaluate. Lipase mildly elevated, otherwise workup unremarkable.  Patient was given IV fluids and Zofran. CT without abnormalities. Patient states he was given flagyl and cipro today at the PCP, advised he take  this as directed. He is Tolerating p.o. intake and resting comfortably in stretcher.  He was given prescription for Zofran.  We discussed follow-up instructions and strict return precautions.  Patient's vital signs were within normal limits and he was discharged home in stable condition.       Amount and/or Complexity of Data Reviewed  Clinical lab tests: reviewed and ordered  Tests in the radiology section of CPT®: reviewed and ordered    Risk of Complications, Morbidity, and/or Mortality  Presenting problems: moderate  Diagnostic procedures: moderate  Management options: moderate    Patient Progress  Patient progress: stable        Final diagnoses:   Pain of upper abdomen   Elevated lipase            Keily Dominguez PA-C  02/19/19 2030

## 2019-02-20 NOTE — DISCHARGE INSTRUCTIONS
Take medications as prescribed including antibiotics as directed by your primary care provider.  Drink plenty of fluids to stay well-hydrated; use ondansetron as needed to help with nausea and vomiting.  Eat a bland diet for the next few days to avoid further GI upset.  May take over-the-counter ibuprofen and Tylenol as needed - 400 mg ibuprofen and/or 500 mg Tylenol every 6 hours.  Follow-up with your primary care provider at earliest available time for reevaluation of symptoms. You may need further evaluation with stool studies and/or colonoscopy.  Return to ED for any changing or worsening symptoms, or any additional concerns including but not limited to severe worsening abdominal pain with fever >100.4, intractable vomiting, hematemesis.

## 2019-02-27 ENCOUNTER — APPOINTMENT (OUTPATIENT)
Dept: CT IMAGING | Facility: HOSPITAL | Age: 49
End: 2019-02-27

## 2019-02-27 ENCOUNTER — HOSPITAL ENCOUNTER (EMERGENCY)
Facility: HOSPITAL | Age: 49
Discharge: HOME | End: 2019-02-27
Attending: STUDENT IN AN ORGANIZED HEALTH CARE EDUCATION/TRAINING PROGRAM | Admitting: STUDENT IN AN ORGANIZED HEALTH CARE EDUCATION/TRAINING PROGRAM

## 2019-02-27 VITALS
HEART RATE: 88 BPM | OXYGEN SATURATION: 99 % | BODY MASS INDEX: 35.76 KG/M2 | HEIGHT: 72 IN | WEIGHT: 264 LBS | RESPIRATION RATE: 16 BRPM | DIASTOLIC BLOOD PRESSURE: 66 MMHG | TEMPERATURE: 98 F | SYSTOLIC BLOOD PRESSURE: 114 MMHG

## 2019-02-27 DIAGNOSIS — K60.2 ANAL FISSURE: ICD-10-CM

## 2019-02-27 DIAGNOSIS — K62.5 RECTAL BLEEDING: ICD-10-CM

## 2019-02-27 DIAGNOSIS — R10.32 LEFT LOWER QUADRANT PAIN: Primary | ICD-10-CM

## 2019-02-27 DIAGNOSIS — N30.00 ACUTE CYSTITIS WITHOUT HEMATURIA: ICD-10-CM

## 2019-02-27 LAB
ALBUMIN SERPL-MCNC: 3.6 G/DL (ref 3.5–5)
ALBUMIN/GLOB SERPL: 1.7 G/DL (ref 1–2)
ALP SERPL-CCNC: 54 U/L (ref 38–126)
ALT SERPL W P-5'-P-CCNC: 37 U/L (ref 13–69)
AMPHET+METHAMPHET UR QL: NEGATIVE
AMPHETAMINES UR QL: NEGATIVE
ANION GAP SERPL CALCULATED.3IONS-SCNC: 8.9 MMOL/L (ref 10–20)
AST SERPL-CCNC: 22 U/L (ref 15–46)
BACTERIA UR QL AUTO: ABNORMAL /HPF
BARBITURATES UR QL SCN: NEGATIVE
BASOPHILS # BLD AUTO: 0.11 10*3/MM3 (ref 0–0.2)
BASOPHILS NFR BLD AUTO: 1.4 % (ref 0–2.5)
BENZODIAZ UR QL SCN: NEGATIVE
BILIRUB SERPL-MCNC: 0.2 MG/DL (ref 0.2–1.3)
BILIRUB UR QL STRIP: NEGATIVE
BUN BLD-MCNC: 17 MG/DL (ref 7–20)
BUN/CREAT SERPL: 14.2 (ref 6.3–21.9)
BUPRENORPHINE SERPL-MCNC: NEGATIVE NG/ML
CALCIUM SPEC-SCNC: 8.9 MG/DL (ref 8.4–10.2)
CANNABINOIDS SERPL QL: NEGATIVE
CHLORIDE SERPL-SCNC: 104 MMOL/L (ref 98–107)
CLARITY UR: CLEAR
CO2 SERPL-SCNC: 32 MMOL/L (ref 26–30)
COCAINE UR QL: NEGATIVE
COD CRY URNS QL: ABNORMAL /HPF
COLOR UR: YELLOW
CREAT BLD-MCNC: 1.2 MG/DL (ref 0.6–1.3)
DEPRECATED RDW RBC AUTO: 42.2 FL (ref 37–54)
EOSINOPHIL # BLD AUTO: 0.24 10*3/MM3 (ref 0–0.7)
EOSINOPHIL NFR BLD AUTO: 3.1 % (ref 0–7)
ERYTHROCYTE [DISTWIDTH] IN BLOOD BY AUTOMATED COUNT: 12.8 % (ref 11.5–14.5)
GFR SERPL CREATININE-BSD FRML MDRD: 64 ML/MIN/1.73
GLOBULIN UR ELPH-MCNC: 2.1 GM/DL
GLUCOSE BLD-MCNC: 111 MG/DL (ref 74–98)
GLUCOSE UR STRIP-MCNC: NEGATIVE MG/DL
HCT VFR BLD AUTO: 41.6 % (ref 42–52)
HEMOCCULT STL QL: NEGATIVE
HGB BLD-MCNC: 13.4 G/DL (ref 14–18)
HGB UR QL STRIP.AUTO: NEGATIVE
HYALINE CASTS UR QL AUTO: ABNORMAL /LPF
IMM GRANULOCYTES # BLD AUTO: 0.03 10*3/MM3 (ref 0–0.06)
IMM GRANULOCYTES NFR BLD AUTO: 0.4 % (ref 0–0.6)
KETONES UR QL STRIP: NEGATIVE
LEUKOCYTE ESTERASE UR QL STRIP.AUTO: ABNORMAL
LIPASE SERPL-CCNC: 456 U/L (ref 23–300)
LYMPHOCYTES # BLD AUTO: 1.46 10*3/MM3 (ref 0.6–3.4)
LYMPHOCYTES NFR BLD AUTO: 18.7 % (ref 10–50)
MCH RBC QN AUTO: 28.9 PG (ref 27–31)
MCHC RBC AUTO-ENTMCNC: 32.2 G/DL (ref 30–37)
MCV RBC AUTO: 89.8 FL (ref 80–94)
METHADONE UR QL SCN: NEGATIVE
MONOCYTES # BLD AUTO: 0.53 10*3/MM3 (ref 0–0.9)
MONOCYTES NFR BLD AUTO: 6.8 % (ref 0–12)
NEUTROPHILS # BLD AUTO: 5.43 10*3/MM3 (ref 2–6.9)
NEUTROPHILS NFR BLD AUTO: 69.6 % (ref 37–80)
NITRITE UR QL STRIP: NEGATIVE
NRBC BLD AUTO-RTO: 0 /100 WBC (ref 0–0)
OPIATES UR QL: NEGATIVE
OXYCODONE UR QL SCN: NEGATIVE
PCP UR QL SCN: NEGATIVE
PH UR STRIP.AUTO: 7 [PH] (ref 5–8)
PLATELET # BLD AUTO: 268 10*3/MM3 (ref 130–400)
PMV BLD AUTO: 10.3 FL (ref 6–12)
POTASSIUM BLD-SCNC: 3.9 MMOL/L (ref 3.5–5.1)
PROPOXYPH UR QL: NEGATIVE
PROT SERPL-MCNC: 5.7 G/DL (ref 6.3–8.2)
PROT UR QL STRIP: NEGATIVE
RBC # BLD AUTO: 4.63 10*6/MM3 (ref 4.7–6.1)
RBC # UR: ABNORMAL /HPF
REF LAB TEST METHOD: ABNORMAL
SODIUM BLD-SCNC: 141 MMOL/L (ref 137–145)
SP GR UR STRIP: 1.02 (ref 1–1.03)
SQUAMOUS #/AREA URNS HPF: ABNORMAL /HPF
TRICYCLICS UR QL SCN: NEGATIVE
UROBILINOGEN UR QL STRIP: ABNORMAL
WBC NRBC COR # BLD: 7.8 10*3/MM3 (ref 4.8–10.8)
WBC UR QL AUTO: ABNORMAL /HPF

## 2019-02-27 PROCEDURE — 25010000002 IOPAMIDOL 61 % SOLUTION: Performed by: STUDENT IN AN ORGANIZED HEALTH CARE EDUCATION/TRAINING PROGRAM

## 2019-02-27 PROCEDURE — 80306 DRUG TEST PRSMV INSTRMNT: CPT | Performed by: PHYSICIAN ASSISTANT

## 2019-02-27 PROCEDURE — 81001 URINALYSIS AUTO W/SCOPE: CPT | Performed by: PHYSICIAN ASSISTANT

## 2019-02-27 PROCEDURE — 82272 OCCULT BLD FECES 1-3 TESTS: CPT | Performed by: PHYSICIAN ASSISTANT

## 2019-02-27 PROCEDURE — 80053 COMPREHEN METABOLIC PANEL: CPT | Performed by: PHYSICIAN ASSISTANT

## 2019-02-27 PROCEDURE — 87086 URINE CULTURE/COLONY COUNT: CPT | Performed by: PHYSICIAN ASSISTANT

## 2019-02-27 PROCEDURE — 99283 EMERGENCY DEPT VISIT LOW MDM: CPT

## 2019-02-27 PROCEDURE — 25010000002 KETOROLAC TROMETHAMINE PER 15 MG: Performed by: PHYSICIAN ASSISTANT

## 2019-02-27 PROCEDURE — 96361 HYDRATE IV INFUSION ADD-ON: CPT

## 2019-02-27 PROCEDURE — 74177 CT ABD & PELVIS W/CONTRAST: CPT

## 2019-02-27 PROCEDURE — 85025 COMPLETE CBC W/AUTO DIFF WBC: CPT | Performed by: PHYSICIAN ASSISTANT

## 2019-02-27 PROCEDURE — 96374 THER/PROPH/DIAG INJ IV PUSH: CPT

## 2019-02-27 PROCEDURE — 83690 ASSAY OF LIPASE: CPT | Performed by: PHYSICIAN ASSISTANT

## 2019-02-27 RX ORDER — CIPROFLOXACIN 500 MG/1
500 TABLET, FILM COATED ORAL 2 TIMES DAILY
Qty: 14 TABLET | Refills: 0 | Status: SHIPPED | OUTPATIENT
Start: 2019-02-27 | End: 2019-05-21

## 2019-02-27 RX ORDER — ONDANSETRON 4 MG/1
4 TABLET, ORALLY DISINTEGRATING ORAL EVERY 8 HOURS PRN
Qty: 8 TABLET | Refills: 0 | Status: SHIPPED | OUTPATIENT
Start: 2019-02-27 | End: 2019-05-21

## 2019-02-27 RX ORDER — SODIUM CHLORIDE 0.9 % (FLUSH) 0.9 %
10 SYRINGE (ML) INJECTION AS NEEDED
Status: DISCONTINUED | OUTPATIENT
Start: 2019-02-27 | End: 2019-02-27 | Stop reason: HOSPADM

## 2019-02-27 RX ORDER — KETOROLAC TROMETHAMINE 30 MG/ML
10 INJECTION, SOLUTION INTRAMUSCULAR; INTRAVENOUS ONCE
Status: COMPLETED | OUTPATIENT
Start: 2019-02-27 | End: 2019-02-27

## 2019-02-27 RX ADMIN — IOPAMIDOL 100 ML: 612 INJECTION, SOLUTION INTRAVENOUS at 15:53

## 2019-02-27 RX ADMIN — KETOROLAC TROMETHAMINE 10 MG: 30 INJECTION, SOLUTION INTRAMUSCULAR at 15:34

## 2019-02-27 RX ADMIN — SODIUM CHLORIDE 1000 ML: 9 INJECTION, SOLUTION INTRAVENOUS at 15:34

## 2019-03-01 LAB — BACTERIA SPEC AEROBE CULT: NO GROWTH

## 2019-05-21 ENCOUNTER — RESULTS ENCOUNTER (OUTPATIENT)
Dept: FAMILY MEDICINE CLINIC | Facility: CLINIC | Age: 49
End: 2019-05-21

## 2019-05-21 ENCOUNTER — OFFICE VISIT (OUTPATIENT)
Dept: FAMILY MEDICINE CLINIC | Facility: CLINIC | Age: 49
End: 2019-05-21

## 2019-05-21 VITALS
HEIGHT: 72 IN | WEIGHT: 260 LBS | RESPIRATION RATE: 20 BRPM | DIASTOLIC BLOOD PRESSURE: 82 MMHG | OXYGEN SATURATION: 99 % | HEART RATE: 99 BPM | TEMPERATURE: 98.2 F | SYSTOLIC BLOOD PRESSURE: 146 MMHG | BODY MASS INDEX: 35.21 KG/M2
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
## 2019-05-21 DIAGNOSIS — E11.65 TYPE 2 DIABETES MELLITUS WITH HYPERGLYCEMIA, WITHOUT LONG-TERM CURRENT USE OF INSULIN (HCC): Primary | ICD-10-CM

## 2019-05-21 DIAGNOSIS — E78.2 MIXED HYPERLIPIDEMIA: ICD-10-CM

## 2019-05-21 DIAGNOSIS — E11.65 TYPE 2 DIABETES MELLITUS WITH HYPERGLYCEMIA, WITHOUT LONG-TERM CURRENT USE OF INSULIN (HCC): ICD-10-CM

## 2019-05-21 DIAGNOSIS — Z11.3 SCREENING EXAMINATION FOR STD (SEXUALLY TRANSMITTED DISEASE): ICD-10-CM

## 2019-05-21 DIAGNOSIS — F41.9 ANXIETY: ICD-10-CM

## 2019-05-21 DIAGNOSIS — G47.33 OBSTRUCTIVE SLEEP APNEA: ICD-10-CM

## 2019-05-21 DIAGNOSIS — I10 ESSENTIAL HYPERTENSION: ICD-10-CM

## 2019-05-21 PROCEDURE — 99204 OFFICE O/P NEW MOD 45 MIN: CPT | Performed by: INTERNAL MEDICINE

## 2019-05-21 RX ORDER — BUSPIRONE HYDROCHLORIDE 10 MG/1
10 TABLET ORAL 2 TIMES DAILY
Qty: 60 TABLET | Refills: 5 | Status: SHIPPED | OUTPATIENT
Start: 2019-05-21 | End: 2022-04-13

## 2019-05-21 RX ORDER — TIZANIDINE 4 MG/1
4 TABLET ORAL 2 TIMES DAILY
COMMUNITY
End: 2022-04-13

## 2019-05-21 RX ORDER — BUSPIRONE HYDROCHLORIDE 10 MG/1
10 TABLET ORAL DAILY
COMMUNITY
End: 2019-05-21 | Stop reason: SDUPTHER

## 2019-05-21 RX ORDER — LISINOPRIL 10 MG/1
10 TABLET ORAL DAILY
Qty: 90 TABLET | Refills: 1 | Status: SHIPPED | OUTPATIENT
Start: 2019-05-21 | End: 2022-04-13

## 2019-05-21 NOTE — PROGRESS NOTES
Chief Complaint:  Diabetes, hypertension    HPI:  Rupert Briscoe is a 48 y.o. male who presents today for follow-up multiple chronic medical conditions  Diabetes-does not check sugar at home.  Unsure what previous A1c was.  He is to be taking metformin 500 mg daily.  No longer taking anything as of today.  Hypertension- previously prescribed lisinopril 2.5 mg.  Currently not taking any medication today.  Anxiety-usually taking BuSpar 10 mg daily but is discontinue this medication.  He would like to be restarted today if possible.  Hyperlipidemia- checking blood work.  Currently taking atorvastatin 10 mg.  Bug bite-  lateral left arm that he would like evaluated.  Denies any fevers or chills.  No discharge.    ROS:  Constitutional: no fevers, night sweats or unexplained weight loss  Eyes: no vision changes  ENT: no runny nose, ear pain, sore throat  Cardio: no chest pain, palpitations  Pulm: no shortness of breath, wheezing, or cough  GI: no abdominal pain or changes in bowel movements  : no difficulty urinating  MSK: no difficulty ambulating, no joint pain  Neuro: no weakness, dizziness or headache  Psych: no trouble sleeping  Endo: no change in appetite      Past Medical History:   Diagnosis Date   • Colon polyp    • Depression    • Diabetes mellitus (CMS/HCC)    • Diverticulosis    • Hyperlipidemia    • Hypertension    • Migraine    • Pancreatitis       Family History   Adopted: Yes      Social History     Socioeconomic History   • Marital status: Single     Spouse name: Not on file   • Number of children: Not on file   • Years of education: Not on file   • Highest education level: Not on file   Tobacco Use   • Smoking status: Current Every Day Smoker     Packs/day: 0.50   • Smokeless tobacco: Never Used   Substance and Sexual Activity   • Alcohol use: Yes     Comment: soc    • Drug use: No   • Sexual activity: Yes     Partners: Female     Birth control/protection: None      No Known Allergies     There is  no immunization history on file for this patient.     PE:  Vitals:    05/21/19 1348   BP: 146/82   Pulse: 99   Resp: 20   Temp: 98.2 °F (36.8 °C)   SpO2: 99%        Gen Appearance: NAD  HEENT: Normocephalic, PERRLA, no thyromegaly, trache midline  Heart: RRR, normal S1 and S2, no murmur  Lungs: CTA b/l, no wheezing, no crackles  Abdomen: Soft, non-tender, non-distended, no guarding and BSx4  MSK: Moves all extremities well, normal gait, no peripheral edema, bug bite left forearm with surrounding erythema and swelling, no discharge  Pulses: Palpable and equal b/l  Lymph nodes: No palpable lymphadenopathy   Neuro: No focal deficits      Current Outpatient Medications   Medication Sig Dispense Refill   • aspirin 81 MG chewable tablet Chew 81 mg Daily.     • atorvastatin (LIPITOR) 10 MG tablet Take 10 mg by mouth Daily.     • busPIRone (BUSPAR) 10 MG tablet Take 1 tablet by mouth 2 (Two) Times a Day. 60 tablet 5   • metFORMIN (GLUCOPHAGE) 500 MG tablet Take 1 tablet by mouth 2 (Two) Times a Day With Meals. 120 tablet 5   • tiZANidine (ZANAFLEX) 4 MG tablet Take 4 mg by mouth 2 (Two) Times a Day.     • lisinopril (PRINIVIL,ZESTRIL) 10 MG tablet Take 1 tablet by mouth Daily. 90 tablet 1     Current Facility-Administered Medications   Medication Dose Route Frequency Provider Last Rate Last Dose   • ciprofloxacin (CILOXAN) 0.3 % ophthalmic solution 2 drop  2 drop Left Eye Q4H While Awake Eldon Paniagua MD William was seen today for establish care.    Diagnoses and all orders for this visit:    Type 2 diabetes mellitus with hyperglycemia, without long-term current use of insulin (CMS/[facility])  -     CBC & Differential; Future  -     Comprehensive Metabolic Panel; Future  -     Lipid Panel; Future  -     Microalbumin / Creatinine Urine Ratio - Urine, Clean Catch; Future  -     Hemoglobin A1c; Future  -     metFORMIN (GLUCOPHAGE) 500 MG tablet; Take 1 tablet by mouth 2 (Two) Times a Day With Meals.  Resume metformin 500  mg twice daily.  Checking A1c and microalbumin.  Will need to be referred for diabetic eye exam at follow-up visit.  Essential hypertension  -     lisinopril (PRINIVIL,ZESTRIL) 10 MG tablet; Take 1 tablet by mouth Daily.  Elevated today.  Increase lisinopril to 10 mg.   Mixed hyperlipidemia  Continue atorvastatin.  Check lipid panel today.  Obstructive sleep apnea  -     Ambulatory Referral to Sleep Medicine  Previous history of sleep apnea.  Recent testing 3 years prior, will need to get results.  He was prescribed CPAP before although he was unable to sleep with this past.  He would like referral to sleep physician.  Anxiety  -     busPIRone (BUSPAR) 10 MG tablet; Take 1 tablet by mouth 2 (Two) Times a Day.  Continue BuSpar.  Increase to 2 times daily.  Screening examination for STD (sexually transmitted disease)  -     HIV-1 / O / 2 Ag / Antibody 4th Generation; Future  -     Chlamydia trachomatis, Neisseria gonorrhoeae, PCR - , Urine, Clean Catch; Future  -     RPR; Future  -     Hepatitis panel, acute; Future  Asymptomatic.  Would like to be screened for STDs.  Bug bite, initial encounter  Recommend antihistamine for swelling.  No signs of infection today.  Call return to clinic in 1 week if no improvement or any worsening symptoms or fevers or chills.       Return in about 3 months (around 8/21/2019).

## 2019-05-26 ENCOUNTER — RESULTS ENCOUNTER (OUTPATIENT)
Dept: FAMILY MEDICINE CLINIC | Facility: CLINIC | Age: 49
End: 2019-05-26

## 2019-05-26 DIAGNOSIS — Z11.3 SCREENING EXAMINATION FOR STD (SEXUALLY TRANSMITTED DISEASE): ICD-10-CM

## 2019-05-29 ENCOUNTER — LAB REQUISITION (OUTPATIENT)
Dept: LAB | Facility: HOSPITAL | Age: 49
End: 2019-05-29

## 2019-05-29 DIAGNOSIS — Z00.00 ROUTINE GENERAL MEDICAL EXAMINATION AT A HEALTH CARE FACILITY: ICD-10-CM

## 2019-05-29 PROCEDURE — 36415 COLL VENOUS BLD VENIPUNCTURE: CPT | Performed by: INTERNAL MEDICINE

## 2019-05-30 ENCOUNTER — OFFICE VISIT (OUTPATIENT)
Dept: FAMILY MEDICINE CLINIC | Facility: CLINIC | Age: 49
End: 2019-05-30

## 2019-05-30 VITALS
TEMPERATURE: 97.2 F | BODY MASS INDEX: 35.24 KG/M2 | DIASTOLIC BLOOD PRESSURE: 88 MMHG | HEART RATE: 104 BPM | RESPIRATION RATE: 18 BRPM | WEIGHT: 260.2 LBS | SYSTOLIC BLOOD PRESSURE: 134 MMHG | HEIGHT: 72 IN | OXYGEN SATURATION: 98 %

## 2019-05-30 DIAGNOSIS — E11.65 TYPE 2 DIABETES MELLITUS WITH HYPERGLYCEMIA, WITHOUT LONG-TERM CURRENT USE OF INSULIN (HCC): Primary | ICD-10-CM

## 2019-05-30 DIAGNOSIS — E78.2 MIXED HYPERLIPIDEMIA: ICD-10-CM

## 2019-05-30 DIAGNOSIS — I10 ESSENTIAL HYPERTENSION: ICD-10-CM

## 2019-05-30 LAB
ALBUMIN SERPL-MCNC: 4.4 G/DL (ref 3.5–5.2)
ALBUMIN/CREAT UR: <4.1 MG/G CREAT (ref 0–30)
ALBUMIN/GLOB SERPL: 2.1 G/DL
ALP SERPL-CCNC: 80 U/L (ref 39–117)
ALT SERPL-CCNC: 17 U/L (ref 1–41)
AST SERPL-CCNC: 12 U/L (ref 1–40)
BASOPHILS # BLD AUTO: 0.11 10*3/MM3 (ref 0–0.2)
BASOPHILS NFR BLD AUTO: 1.2 % (ref 0–1.5)
BILIRUB SERPL-MCNC: 0.2 MG/DL (ref 0.2–1.2)
BUN SERPL-MCNC: 10 MG/DL (ref 6–20)
BUN/CREAT SERPL: 8.9 (ref 7–25)
C TRACH RRNA SPEC QL NAA+PROBE: NEGATIVE
CALCIUM SERPL-MCNC: 9.8 MG/DL (ref 8.6–10.5)
CHLORIDE SERPL-SCNC: 104 MMOL/L (ref 98–107)
CHOLEST SERPL-MCNC: 183 MG/DL (ref 0–200)
CO2 SERPL-SCNC: 28.6 MMOL/L (ref 22–29)
CREAT SERPL-MCNC: 1.12 MG/DL (ref 0.76–1.27)
CREAT UR-MCNC: 72.6 MG/DL
EOSINOPHIL # BLD AUTO: 0.22 10*3/MM3 (ref 0–0.4)
EOSINOPHIL NFR BLD AUTO: 2.5 % (ref 0.3–6.2)
ERYTHROCYTE [DISTWIDTH] IN BLOOD BY AUTOMATED COUNT: 13.9 % (ref 12.3–15.4)
GLOBULIN SER CALC-MCNC: 2.1 GM/DL
GLUCOSE SERPL-MCNC: 136 MG/DL (ref 65–99)
HAV IGM SERPL QL IA: NEGATIVE
HBA1C MFR BLD: 6.7 % (ref 4.8–5.6)
HBV CORE IGM SERPL QL IA: NEGATIVE
HBV SURFACE AG SERPL QL IA: NEGATIVE
HCT VFR BLD AUTO: 49.6 % (ref 37.5–51)
HCV AB S/CO SERPL IA: <0.1 S/CO RATIO (ref 0–0.9)
HDLC SERPL-MCNC: 30 MG/DL (ref 40–60)
HGB BLD-MCNC: 15.5 G/DL (ref 13–17.7)
HIV 1+2 AB+HIV1 P24 AG SERPL QL IA: NON REACTIVE
IMM GRANULOCYTES # BLD AUTO: 0.03 10*3/MM3 (ref 0–0.05)
IMM GRANULOCYTES NFR BLD AUTO: 0.3 % (ref 0–0.5)
LDLC SERPL CALC-MCNC: 110 MG/DL (ref 0–100)
LYMPHOCYTES # BLD AUTO: 2.21 10*3/MM3 (ref 0.7–3.1)
LYMPHOCYTES NFR BLD AUTO: 25 % (ref 19.6–45.3)
MCH RBC QN AUTO: 28.1 PG (ref 26.6–33)
MCHC RBC AUTO-ENTMCNC: 31.3 G/DL (ref 31.5–35.7)
MCV RBC AUTO: 90 FL (ref 79–97)
MICROALBUMIN UR-MCNC: <3 UG/ML
MONOCYTES # BLD AUTO: 0.57 10*3/MM3 (ref 0.1–0.9)
MONOCYTES NFR BLD AUTO: 6.4 % (ref 5–12)
N GONORRHOEA RRNA SPEC QL NAA+PROBE: NEGATIVE
NEUTROPHILS # BLD AUTO: 5.7 10*3/MM3 (ref 1.7–7)
NEUTROPHILS NFR BLD AUTO: 64.6 % (ref 42.7–76)
NRBC BLD AUTO-RTO: 0 /100 WBC (ref 0–0.2)
PLATELET # BLD AUTO: 262 10*3/MM3 (ref 140–450)
POTASSIUM SERPL-SCNC: 4.8 MMOL/L (ref 3.5–5.2)
PROT SERPL-MCNC: 6.5 G/DL (ref 6–8.5)
RBC # BLD AUTO: 5.51 10*6/MM3 (ref 4.14–5.8)
RPR SER QL: NON REACTIVE
SODIUM SERPL-SCNC: 142 MMOL/L (ref 136–145)
TRIGL SERPL-MCNC: 214 MG/DL (ref 0–150)
VLDLC SERPL CALC-MCNC: 42.8 MG/DL
WBC # BLD AUTO: 8.84 10*3/MM3 (ref 3.4–10.8)

## 2019-05-30 PROCEDURE — 99214 OFFICE O/P EST MOD 30 MIN: CPT | Performed by: INTERNAL MEDICINE

## 2019-05-30 RX ORDER — BLOOD-GLUCOSE METER
EACH MISCELLANEOUS
Qty: 1 KIT | Refills: 0 | Status: SHIPPED | OUTPATIENT
Start: 2019-05-30

## 2019-05-30 RX ORDER — BLOOD SUGAR DIAGNOSTIC
STRIP MISCELLANEOUS
Qty: 3 EACH | Refills: 0 | Status: SHIPPED | OUTPATIENT
Start: 2019-05-30

## 2019-05-30 RX ORDER — LANCETS
EACH MISCELLANEOUS
Qty: 3 EACH | Refills: 0 | Status: SHIPPED | OUTPATIENT
Start: 2019-05-30

## 2019-05-30 NOTE — PROGRESS NOTES
Chief Complaint:  DOT form, diabetes    HPI:  Rupert Briscoe is a 48 y.o. male who presents today for follow-up type 2 diabetes and completion of DOT form.  No evidence of nephropathy, neuropathy, retinopathy.  Patient reports he went to have diabetic eye exam last week.  He reports normal results.  No documentation of this yet.  Taking metformin as prescribed.  He has a meter but does not check his glucose regularly.  No hypoglycemic events.  No other acute complaints or concerns today.  He has a history of hypertension currently taking lisinopril, does not check blood pressure at home.  He would like to review lab results today as well.    ROS:  Constitutional: no fevers, night sweats or unexplained weight loss  Eyes: no vision changes  ENT: no runny nose, ear pain, sore throat  Cardio: no chest pain, palpitations  Pulm: no shortness of breath, wheezing, or cough  GI: no abdominal pain or changes in bowel movements  : no difficulty urinating  MSK: no difficulty ambulating, no joint pain  Neuro: no weakness, dizziness or headache  Psych: no trouble sleeping  Endo: no change in appetite      Past Medical History:   Diagnosis Date   • Colon polyp    • Depression    • Diabetes mellitus (CMS/HCC)    • Diverticulosis    • Hyperlipidemia    • Hypertension    • Migraine    • Pancreatitis       Family History   Adopted: Yes      Social History     Socioeconomic History   • Marital status: Single     Spouse name: Not on file   • Number of children: Not on file   • Years of education: Not on file   • Highest education level: Not on file   Tobacco Use   • Smoking status: Current Every Day Smoker     Packs/day: 0.50   • Smokeless tobacco: Never Used   Substance and Sexual Activity   • Alcohol use: Yes     Comment: soc    • Drug use: No   • Sexual activity: Yes     Partners: Female     Birth control/protection: None      No Known Allergies     There is no immunization history on file for this patient.     PE:  Vitals:     05/30/19 1045   BP: 134/88   Pulse: 104   Resp: 18   Temp: 97.2 °F (36.2 °C)   SpO2: 98%        Gen Appearance: NAD  HEENT: Normocephalic, PERRLA, no thyromegaly, trache midline  Heart: RRR, normal S1 and S2, no murmur  Lungs: CTA b/l, no wheezing, no crackles  Abdomen: Soft, non-tender, non-distended, no guarding and BSx4  MSK: Moves all extremities well, normal gait, no peripheral edema  Pulses: Palpable and equal b/l  Lymph nodes: No palpable lymphadenopathy   Neuro: No focal deficits      Current Outpatient Medications   Medication Sig Dispense Refill   • aspirin 81 MG chewable tablet Chew 81 mg Daily.     • atorvastatin (LIPITOR) 10 MG tablet Take 10 mg by mouth Daily.     • busPIRone (BUSPAR) 10 MG tablet Take 1 tablet by mouth 2 (Two) Times a Day. 60 tablet 5   • lisinopril (PRINIVIL,ZESTRIL) 10 MG tablet Take 1 tablet by mouth Daily. 90 tablet 1   • metFORMIN (GLUCOPHAGE) 500 MG tablet Take 1 tablet by mouth 2 (Two) Times a Day With Meals. 120 tablet 5   • tiZANidine (ZANAFLEX) 4 MG tablet Take 4 mg by mouth 2 (Two) Times a Day.     • ACCU-CHEK SONU PLUS test strip Check glucose daily in the morning. 3 each 0   • Blood Glucose Monitoring Suppl (ACCU-CHEK SONU PLUS) w/Device kit Check glucose daily in the morning. 1 kit 0   • Lancets (ACCU-CHEK MULTICLIX) lancets Check glucose daily in the morning. 3 each 0     Current Facility-Administered Medications   Medication Dose Route Frequency Provider Last Rate Last Dose   • ciprofloxacin (CILOXAN) 0.3 % ophthalmic solution 2 drop  2 drop Left Eye Q4H While Awake Eldon Paniagua MD William was seen today for dot physical.    Diagnoses and all orders for this visit:    Type 2 diabetes mellitus with hyperglycemia, without long-term current use of insulin (CMS/[facility])  -     Blood Glucose Monitoring Suppl (ACCU-CHEK SONU PLUS) w/Device kit; Check glucose daily in the morning.  -     ACCU-CHEK SONU PLUS test strip; Check glucose daily in the morning.  -      Lancets (ACCU-CHEK MULTICLIX) lancets; Check glucose daily in the morning.  Send in prescription for new meter needed.  Will fax over diabetic eye exam results.  Continue metformin.  A1c 6.7%.  Essential hypertension  Well-controlled.  Continue current medication.  Mixed hyperlipidemia  Continue statin.       No Follow-up on file.

## 2019-06-03 ENCOUNTER — TELEPHONE (OUTPATIENT)
Dept: FAMILY MEDICINE CLINIC | Facility: CLINIC | Age: 49
End: 2019-06-03

## 2019-07-09 ENCOUNTER — OFFICE VISIT (OUTPATIENT)
Dept: FAMILY MEDICINE CLINIC | Facility: CLINIC | Age: 49
End: 2019-07-09

## 2019-07-09 VITALS
HEIGHT: 72 IN | WEIGHT: 266 LBS | HEART RATE: 100 BPM | DIASTOLIC BLOOD PRESSURE: 68 MMHG | OXYGEN SATURATION: 99 % | SYSTOLIC BLOOD PRESSURE: 128 MMHG | BODY MASS INDEX: 36.03 KG/M2

## 2019-07-09 DIAGNOSIS — I10 ESSENTIAL HYPERTENSION: ICD-10-CM

## 2019-07-09 DIAGNOSIS — G47.33 OBSTRUCTIVE SLEEP APNEA: Primary | ICD-10-CM

## 2019-07-09 DIAGNOSIS — R09.81 SINUS CONGESTION: ICD-10-CM

## 2019-07-09 PROCEDURE — 99214 OFFICE O/P EST MOD 30 MIN: CPT | Performed by: INTERNAL MEDICINE

## 2019-07-09 RX ORDER — MONTELUKAST SODIUM 10 MG/1
10 TABLET ORAL NIGHTLY
Qty: 30 TABLET | Refills: 5 | Status: SHIPPED | OUTPATIENT
Start: 2019-07-09 | End: 2022-04-13

## 2019-07-12 ENCOUNTER — TELEPHONE (OUTPATIENT)
Dept: FAMILY MEDICINE CLINIC | Facility: CLINIC | Age: 49
End: 2019-07-12

## 2019-08-30 ENCOUNTER — APPOINTMENT (OUTPATIENT)
Dept: SLEEP MEDICINE | Facility: HOSPITAL | Age: 49
End: 2019-08-30

## 2019-12-27 ENCOUNTER — TRANSCRIBE ORDERS (OUTPATIENT)
Dept: PHYSICAL THERAPY | Facility: CLINIC | Age: 49
End: 2019-12-27

## 2019-12-27 DIAGNOSIS — S96.911A RIGHT ANKLE STRAIN, INITIAL ENCOUNTER: Primary | ICD-10-CM

## 2020-01-27 ENCOUNTER — TREATMENT (OUTPATIENT)
Dept: PHYSICAL THERAPY | Facility: CLINIC | Age: 50
End: 2020-01-27

## 2020-01-27 DIAGNOSIS — S93.401A SPRAIN OF RIGHT ANKLE, UNSPECIFIED LIGAMENT, INITIAL ENCOUNTER: Primary | ICD-10-CM

## 2020-01-27 PROCEDURE — 97110 THERAPEUTIC EXERCISES: CPT | Performed by: PHYSICAL THERAPIST

## 2020-01-27 PROCEDURE — 97162 PT EVAL MOD COMPLEX 30 MIN: CPT | Performed by: PHYSICAL THERAPIST

## 2020-01-27 NOTE — PROGRESS NOTES
Physical Therapy Initial Evaluation and Plan of Care    TOTAL TIME: 60 MINUTES    Subjective Evaluation    History of Present Illness  Mechanism of injury: Stepped in a hole at work on Dec 26th;  at [facility] x 1 month   Patient ranks his pain as very high at rest- 7/10 currently    'thought I was ok, was walking around' then it started throbbing; went to the hospital and x rays were clear; f/u with [facility] med  Was in a boot prn, wear it if he's up on his foot for prolonged period- is not wearing any boot or ankle brace into therapy today  Taking ibuprofen and mm relaxer at home, not doing any exercises, no icing- 'just trying to stay off of it'      Pain with: prolonged standing, ambulation  Off work at this time  '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'    PMH:  DM - does not check blood sugars, does not have insurance  Anxiety   Depression  Headaches  Sinus disease  SOB         Patient Occupation:    Precautions and Work Restrictions: off work Quality of life: fair    Pain  Current pain ratin  Location: achilles area, lateral side of ankle, medial side of ankle   Quality: sharp  Relieving factors: rest, support and medications (ibuprofen)  Aggravating factors: movement, standing, stairs, prolonged positioning, ambulation and squatting  Progression: no change    Diagnostic Tests  X-ray: normal    Patient Goals  Patient goals for therapy: increased strength, return to work, increased motion, decreased pain, return to sport/leisure activities and independence with ADLs/IADLs             Objective       Observations     Right Ankle/Foot   Negative for atrophy, deformity and edema.     Palpation     Right Tenderness of the soleus.     Tenderness     Right Ankle/Foot   Tenderness in the Achilles insertion, anterior ankle, anterior talofibular ligament, calcaneofibular ligament, dorsum foot, lateral malleolus, peroneal tendon,  posterior talofibular ligament and proximal Achilles. No tenderness in the deltoid ligament, medial malleolus and posterior tibial tendon.     Additional Tenderness Details  Patient displays hyper-sensitivity to very light palpation throughout right ankle; symptom magnification and exaggerated response to touch; therapist unable to palpate with even enough pressure to indent skin before patient demonstrated: gasping, puffing, jerking foot away, etc  Upon rolling over from prone to supine, patient able to quickly and easily move leg and foot and forcefully plop lower extremity onto the table with no apparent discomfort    Neurological Testing     Sensation     Ankle/Foot   Left Ankle/Foot   Intact: light touch    Right Ankle/Foot   Intact: light touch     Active Range of Motion   Left Ankle/Foot   Normal active range of motion    Right Ankle/Foot   Dorsiflexion (ke): 5 degrees with pain  Plantar flexion: 30 degrees with pain  Inversion: 20 degrees with pain  Eversion: 10 degrees with pain    Passive Range of Motion     Additional Passive Range of Motion Details  Unable to assess     Strength/Myotome Testing     Additional Strength Details  Unable to assess due to inability to palpate    Grossly 3+/5 right ankle    Ambulates with minimal antalgia with no boot, brace, etc    Tests     Additional Tests Details  Unable to perform secondary unable to touch patient due to over-reaction    General Comments     Ankle/Foot Comments   No observable edema         Assessment & Plan     Assessment  Impairments: abnormal gait, abnormal or restricted ROM, activity intolerance, impaired balance, impaired physical strength, lacks appropriate home exercise program, pain with function and weight-bearing intolerance  Assessment details: Patient presents today with c/o right ankle pain after stepping in a hole while walking at work; patient displayed over-reaction and symptom magnification during the examination; ambulates with mild  antalgia today, not wearing any brace or boot; no observable edema or ecchymosis; displays limited ROM and lacks full functional strength; patient states he needs to improve within the next month so that 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 Independent with HEP  2. Display full AROM all planes of right ankle without pain  3. Display 5/5 MMT strength right ankle  4. Able to perform work simulated tasks without pain or dysfunction  5. Improve LEFS to > 50/80    Plan  Therapy options: will be seen for skilled physical therapy services  Planned modality interventions: iontophoresis, TENS, thermotherapy (hydrocollator packs), ultrasound, high voltage pulsed current (pain management), cryotherapy and electrical stimulation/Russian stimulation  Planned therapy interventions: neuromuscular re-education, manual therapy, orthotic fitting/training, soft tissue mobilization, strengthening, stretching, therapeutic activities, joint mobilization, home exercise program, gait training, functional ROM exercises, flexibility, body mechanics training and balance/weight-bearing training  Frequency: 2x week  Duration in visits: 8  Treatment plan discussed with: patient  Plan details: Issued HEP for AROM of right ankle to be done 3x / day, f/b ice x 10 mins to achilles and lateral ankle        Manual Therapy:         mins  42185;  Therapeutic Exercise:    30     mins  04540;     Neuromuscular Sary:        mins  98572;    Therapeutic Activity:          mins  32294;     Gait Training:           mins  25419;     Ultrasound:          mins  56957;    Electrical Stimulation:         mins  36677 ( );  Dry Needling          mins self-pay    Timed Treatment:   30   mins   Total Treatment:     60   mins    PT  SIGNATURE: Max Foster, PT   DATE TREATMENT INITIATED: 1/27/2020    Initial Certification  Certification Period: 4/26/2020  I certify that the therapy services are furnished while this patient is under my care.  The services outlined above are required by this patient, and will be reviewed every 90 days.     PHYSICIAN: Stan Hammond MD      DATE:     Please sign and return via fax to  .. Thank you, [facility].

## 2020-01-31 ENCOUNTER — TREATMENT (OUTPATIENT)
Dept: PHYSICAL THERAPY | Facility: CLINIC | Age: 50
End: 2020-01-31

## 2020-01-31 DIAGNOSIS — S93.401A SPRAIN OF RIGHT ANKLE, UNSPECIFIED LIGAMENT, INITIAL ENCOUNTER: Primary | ICD-10-CM

## 2020-01-31 PROCEDURE — 97110 THERAPEUTIC EXERCISES: CPT | Performed by: PHYSICAL THERAPIST

## 2020-02-03 ENCOUNTER — TREATMENT (OUTPATIENT)
Dept: PHYSICAL THERAPY | Facility: CLINIC | Age: 50
End: 2020-02-03

## 2020-02-03 DIAGNOSIS — S93.401A SPRAIN OF RIGHT ANKLE, UNSPECIFIED LIGAMENT, INITIAL ENCOUNTER: Primary | ICD-10-CM

## 2020-02-03 PROCEDURE — 97112 NEUROMUSCULAR REEDUCATION: CPT | Performed by: PHYSICAL THERAPIST

## 2020-02-03 PROCEDURE — 97110 THERAPEUTIC EXERCISES: CPT | Performed by: PHYSICAL THERAPIST

## 2020-02-03 NOTE — PROGRESS NOTES
Physical Therapy Daily Progress Note    TOTAL TIME: 70 MINUTES    Rupert Briscoe reports: ankle is feeling better, able to walk better without turning my foot out or dragging it behind me ; gets tired and sore after a couple of hours of walking around town      Objective   See Exercise, Manual, and Modality Logs for complete treatment.     10 MIN OF ICE TO RIGHT ANKLE, ACHILLES AFTER THERAPY    THERAPEUTIC EXERCISES/ACTIVITIES ADDED TODAY: Baps board, 4 way tband blue; total gym squats level 10 2/1 leg, one leg balance on Air ex 3 ways, line walking heel to toe forward and retro    Assessment/Plan  PATIENT NEEDS CONTINUED PHYSICAL THERAPY IN ORDER TO ACHIEVE FULL ROM, STRENGTH AND FUNCTION WITH NO REPORTS OF PAIN IN ORDER TO RTW WITHOUT RESTRICTIONS AND WITHOUT PAIN OR DYSFUNCTION ;   PATIENT HAD NO PAIN COMPLAINTS DURING THIS ADVANCEMENT TO HIGHER LEVEL STRENGTH AND PROPRIOCEPTIVE EXERCISES      Progress per Plan of Care           Manual Therapy:         mins  96748;  Therapeutic Exercise:    30     mins  19289;     Neuromuscular Sary:    30    mins  38488;    Therapeutic Activity:          mins  07093;     Gait Training:           mins  26526;     Ultrasound:          mins  91618;    Electrical Stimulation:         mins  61966 ( );  Dry Needling          mins self-pay    Timed Treatment:   60   mins   Total Treatment:     70   mins    Max Foster, MIRTA  Physical Therapist

## 2020-02-06 NOTE — PROGRESS NOTES
Physical Therapy Daily Progress Note    TOTAL TIME: 65 MINUTES    Rupert Briscoe reports: feeling a little better, more ROM in ankle      Objective   See Exercise, Manual, and Modality Logs for complete treatment.     THERAPEUTIC EXERCISES/ACTIVITIES ADDED TODAY: see flow sheets ; BAPS board, ankle tband 4 ways (issued tband for HEP)    Assessment/Plan  PATIENT NEEDS CONTINUED PHYSICAL THERAPY IN ORDER TO ACHIEVE FULL ROM, STRENGTH AND FUNCTION WITH NO REPORTS OF PAIN IN ORDER TO RTW WITHOUT RESTRICTIONS AND WITHOUT PAIN OR DYSFUNCTION       Progress per Plan of Care and Progress strengthening /stabilization /functional activity           Manual Therapy:         mins  78506;  Therapeutic Exercise:    55     mins  35775;     Neuromuscular Sary:        mins  13571;    Therapeutic Activity:          mins  22408;     Gait Training:           mins  27672;     Ultrasound:          mins  73606;    Electrical Stimulation:         mins  91438 ( );  Dry Needling          mins self-pay    Timed Treatment:   55   mins   Total Treatment:     65   mins    Max Foster, PT  Physical Therapist

## 2020-02-07 ENCOUNTER — TREATMENT (OUTPATIENT)
Dept: PHYSICAL THERAPY | Facility: CLINIC | Age: 50
End: 2020-02-07

## 2020-02-07 DIAGNOSIS — S93.401A SPRAIN OF RIGHT ANKLE, UNSPECIFIED LIGAMENT, INITIAL ENCOUNTER: Primary | ICD-10-CM

## 2020-02-07 PROCEDURE — 97112 NEUROMUSCULAR REEDUCATION: CPT | Performed by: PHYSICAL THERAPIST

## 2020-02-07 PROCEDURE — 97110 THERAPEUTIC EXERCISES: CPT | Performed by: PHYSICAL THERAPIST

## 2020-02-07 NOTE — PROGRESS NOTES
Physical Therapy Daily Progress Note    TOTAL TIME: 70 MINUTES    Rupert Briscoe reports: feels a lot better      Objective   See Exercise, Manual, and Modality Logs for complete treatment.     THERAPEUTIC EXERCISES/ACTIVITIES ADDED TODAY: single leg stance on Air Ex, TG squats 2/1 legs, line walking heel toe FWD/Retro     Patient states he will ice at home    Assessment/Plan  PATIENT NEEDS CONTINUED PHYSICAL THERAPY IN ORDER TO ACHIEVE FULL ROM, STRENGTH AND FUNCTION WITH NO REPORTS OF PAIN IN ORDER TO RTW WITHOUT RESTRICTIONS AND WITHOUT PAIN OR DYSFUNCTION       Progress per Plan of Care           Manual Therapy:         mins  44574;  Therapeutic Exercise:    40     mins  61795;     Neuromuscular Sary:    30    mins  22121;    Therapeutic Activity:          mins  49870;     Gait Training:           mins  83878;     Ultrasound:          mins  66190;    Electrical Stimulation:         mins  87672 ( );  Dry Needling          mins self-pay    Timed Treatment:   70   mins   Total Treatment:     70   mins    Max Foster, PT  Physical Therapist

## 2020-02-10 ENCOUNTER — TREATMENT (OUTPATIENT)
Dept: PHYSICAL THERAPY | Facility: CLINIC | Age: 50
End: 2020-02-10

## 2020-02-10 DIAGNOSIS — S93.401A SPRAIN OF RIGHT ANKLE, UNSPECIFIED LIGAMENT, INITIAL ENCOUNTER: Primary | ICD-10-CM

## 2020-02-10 PROCEDURE — 97110 THERAPEUTIC EXERCISES: CPT | Performed by: PHYSICAL THERAPIST

## 2020-02-10 PROCEDURE — 97112 NEUROMUSCULAR REEDUCATION: CPT | Performed by: PHYSICAL THERAPIST

## 2020-02-10 NOTE — PROGRESS NOTES
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     THERAPEUTIC EXERCISES/ACTIVITIES ADDED TODAY: see flow sheets  ; 1 leg balance 3 way on Air Ex, 12\" box step ups; 1-leg RDL with 4# med ball ; TG calf raises on level 10    Assessment/Plan  Patient is much improved ; able to ambulate without antalgia; performing moderately high level resistive exercises without pain or antalgia    Progress per Plan of Care and Progress strengthening /stabilization /functional activity           Manual Therapy:         mins  16046;  Therapeutic Exercise:    30     mins  92582;     Neuromuscular Sary:    40    mins  02794;    Therapeutic Activity:          mins  37514;     Gait Training:           mins  87404;     Ultrasound:          mins  96405;    Electrical Stimulation:         mins  47820 ( );  Dry Needling          mins self-pay    Timed Treatment:   70   mins   Total Treatment:     70   mins    Max Foster, PT  Physical Therapist  "

## 2020-02-21 ENCOUNTER — DOCUMENTATION (OUTPATIENT)
Dept: PHYSICAL THERAPY | Facility: CLINIC | Age: 50
End: 2020-02-21

## 2020-02-21 NOTE — PROGRESS NOTES
Discharge Summary  Discharge Summary from Physical Therapy Report      Dates  PT visit: 1/27-2/10/20  Number of Visits: 5     Principal Treatments Provided: TE, NM re-ed     Current condition: discharged from MD back to work  Test measurements: n/a          Work status: usual work    Goals: All Met    Prognosis good    Comments/ Objective Status: returned to work by MD    Reason for plan status: Patient achieved goals          Plan of care: Continue with current home exercise program as instructed      Date of Discharge 2/21/20        Max Foster, PT  Physical Therapist

## 2020-03-26 ENCOUNTER — NURSE TRIAGE (OUTPATIENT)
Dept: CALL CENTER | Facility: HOSPITAL | Age: 50
End: 2020-03-26

## 2020-03-26 ENCOUNTER — TELEPHONE (OUTPATIENT)
Dept: FAMILY MEDICINE CLINIC | Facility: CLINIC | Age: 50
End: 2020-03-26

## 2020-03-26 NOTE — TELEPHONE ENCOUNTER
"Patient has  Body aches, light headness soa. Patient has had the symptoms for \"couple of weeks\" has been swab for the flu and COVID 19 both negative. Suggested warm liquids, tea with honey, may be tylenol. To stop smoking, recall the HUB to get the patient seen earlier may be tomorrow    Reason for Disposition  • [1] MODERATE longstanding difficulty breathing (e.g., speaks in phrases, SOB even at rest, pulse 100-120) AND [2] SAME as normal    Additional Information  • Negative: [1] Wheezing (high pitched whistling sound) AND [2] previous asthma attacks or use of asthma medicines  • Negative: Earache  • Negative: Sinus pain or pressure  • Negative: Sore throat  • Negative: Cough, productive of sputum (phlegm)  • Negative: Cough, not productive (dry cough)  • Negative: Runny nose or congested nose  • Negative: Fever  • Difficulty breathing, severe  • Negative: [1] Breathing stopped AND [2] hasn't returned  • Negative: Choking on something  • Negative: Severe difficulty breathing (e.g., struggling for each breath, speaks in single words)  • Negative: Bluish (or gray) lips or face now  • Negative: Difficult to awaken or acting confused (e.g., disoriented, slurred speech)  • Negative: Passed out (i.e., lost consciousness, collapsed and was not responding)  • Negative: Wheezing started suddenly after medicine, an allergic food or bee sting  • Negative: Stridor  • Negative: Slow, shallow and weak breathing  • Negative: Sounds like a life-threatening emergency to the triager  • Negative: Chest pain  • Negative: [1] Wheezing (high pitched whistling sound) AND [2] previous asthma attacks or use of asthma medicines  • Negative: [1] Difficulty breathing AND [2] only present when coughing  • Negative: [1] Difficulty breathing AND [2] only from stuffy or runny nose  • Negative: [1] MODERATE difficulty breathing (e.g., speaks in phrases, SOB even at rest, pulse 100-120) AND [2] NEW-onset or WORSE than normal  • Negative: Wheezing " "can be heard across the room  • Negative: Drooling or spitting out saliva (because can't swallow)  • Negative: History of prior \"blood clot\" in leg or lungs (i.e., deep vein thrombosis, pulmonary embolism)  • Negative: History of inherited increased risk of blood clots (e.g., Factor 5 Leiden, Anti-thrombin 3, Protein C or Protein S deficiency, Prothrombin mutation)  • Negative: Recent illness requiring prolonged bedrest (i.e., immobilization)  • Negative: Hip or leg fracture in past 2 months (e.g., had cast on leg or ankle)  • Negative: Major surgery in the past month  • Negative: Recent long-distance travel with prolonged time in car, bus, plane, or train (i.e., within past 2 weeks; 6 or  more hours duration)  • Negative: Extra heart beats OR irregular heart beating   (i.e., \"palpitations\")  • Negative: Fever > 103 F (39.4 C)  • Negative: [1] Fever > 101 F (38.3 C) AND [2] age > 60  • Negative: [1] Fever > 100.0 F (37.8 C) AND [2] bedridden (e.g., nursing home patient, CVA, chronic illness, recovering from surgery)  • Negative: [1] Fever > 100.0 F (37.8 C) AND [2] diabetes mellitus or weak immune system (e.g., HIV positive, cancer chemo, splenectomy, organ transplant, chronic steroids)  • Negative: [1] Periods where breathing stops and then resumes normally AND [2] bedridden (e.g., nursing home patient, CVA)  • Negative: Pregnant or postpartum (< 1 month since delivery)  • Negative: Patient sounds very sick or weak to the triager  • Negative: [1] MILD difficulty breathing (e.g., minimal/no SOB at rest, SOB with walking, pulse <100) AND [2] NEW-onset or WORSE than normal  • Negative: [1] Longstanding difficulty breathing (e.g., CHF, COPD, emphysema) AND [2] WORSE than normal  • Negative: [1] Longstanding difficulty breathing AND [2] not responding to usual therapy  • Negative: [1] Continuous (nonstop) coughing AND [2] keeps from working or sleeping    Answer Assessment - Initial Assessment Questions  1. RESPIRATORY " "STATUS: \"Describe your breathing?\" (e.g., wheezing, shortness of breath, unable to speak, severe coughing)       Shortness of air, trouble across the floot  2. ONSET: \"When did this breathing problem begin?\"       Couple weeks  3. PATTERN \"Does the difficult breathing come and go, or has it been constant since it started?\"        constant  4. SEVERITY: \"How bad is your breathing?\" (e.g., mild, moderate, severe)     - MILD: No SOB at rest, mild SOB with walking, speaks normally in sentences, can lay down, no retractions, pulse < 100.     - MODERATE: SOB at rest, SOB with minimal exertion and prefers to sit, cannot lie down flat, speaks in phrases, mild retractions, audible wheezing, pulse 100-120.     - SEVERE: Very SOB at rest, speaks in single words, struggling to breathe, sitting hunched forward, retractions, pulse > 120       moderated  5. RECURRENT SYMPTOM: \"Have you had difficulty breathing before?\" If so, ask: \"When was the last time?\" and \"What happened that time?\"       no  6. CARDIAC HISTORY: \"Do you have any history of heart disease?\" (e.g., heart attack, angina, bypass surgery, angioplasty)       no  7. LUNG HISTORY: \"Do you have any history of lung disease?\"  (e.g., pulmonary embolus, asthma, emphysema)      no  8. CAUSE: \"What do you think is causing the breathing problem?\"       unsur3  9. OTHER SYMPTOMS: \"Do you have any other symptoms? (e.g., dizziness, runny nose, cough, chest pain, fever)      Dizziness, cough  And chest pain with a deep breathe  10. PREGNANCY: \"Is there any chance you are pregnant?\" \"When was your last menstrual period?\"        na  11. TRAVEL: \"Have you traveled out of the country in the last month?\" (e.g., travel history, exposures)        na    Protocols used: BREATHING DIFFICULTY-ADULT-AH, RESPIRATORY MULTIPLE SYMPTOMS - GUIDELINE SELECTION-ADULT-AH      "

## 2020-04-01 ENCOUNTER — OFFICE VISIT (OUTPATIENT)
Dept: FAMILY MEDICINE CLINIC | Facility: CLINIC | Age: 50
End: 2020-04-01

## 2020-04-01 DIAGNOSIS — H93.13 TINNITUS OF BOTH EARS: Primary | ICD-10-CM

## 2020-04-01 DIAGNOSIS — G43.809 OTHER MIGRAINE WITHOUT STATUS MIGRAINOSUS, NOT INTRACTABLE: ICD-10-CM

## 2020-04-01 PROCEDURE — 99442 PR PHYS/QHP TELEPHONE EVALUATION 11-20 MIN: CPT | Performed by: INTERNAL MEDICINE

## 2020-04-01 RX ORDER — AMOXICILLIN AND CLAVULANATE POTASSIUM 875; 125 MG/1; MG/1
1 TABLET, FILM COATED ORAL 2 TIMES DAILY
Qty: 14 TABLET | Refills: 0 | Status: SHIPPED | OUTPATIENT
Start: 2020-04-01 | End: 2020-04-08

## 2020-04-01 NOTE — PROGRESS NOTES
Chief Complaint   Patient presents with   • Migraine   • Tinnitus       HPI:  Rupert Briscoe is a 49 y.o. male who presents today for tinnitus and frontal migraine for the past 2 weeks.  Patient reports he initially presented to  [facility] with sinus congestion cough and sore throat.  He was tested for coronavirus and this was negative x2.  He reports the sinus congestion is improved although he still has ear fullness and tinnitus as well as a persistent frontal headache.  He denies any fevers or chills.  He reports he has not been taking his cholesterol or diabetes medicine.  He states that he did check his sugar at  [facility] and it was within normal limits.  No known coronavirus exposures.    ROS:  Constitutional: no fevers, night sweats or unexplained weight loss  Eyes: no vision changes  ENT: no runny nose, +ear pain, +sore throat  Cardio: no chest pain, palpitations  Pulm: no shortness of breath, wheezing, or cough  GI: no abdominal pain or changes in bowel movements  : no difficulty urinating  MSK: no difficulty ambulating, no joint pain  Neuro: no weakness, dizziness or headache  Psych: no trouble sleeping  Endo: no change in appetite      Past Medical History:   Diagnosis Date   • Allergic    • Anxiety    • Colon polyp    • Depression    • Diabetes mellitus (CMS/[facility])    • Diverticulosis    • Hyperlipidemia    • Hypertension    • Migraine    • Pancreatitis    • Prostatitis    • Shortness of breath    • Sinusitis    • Urinary tract infection       Family History   Adopted: Yes      Social History     Socioeconomic History   • Marital status: Single     Spouse name: Not on file   • Number of children: Not on file   • Years of education: Not on file   • Highest education level: Not on file   Tobacco Use   • Smoking status: Former Smoker     Packs/day: 0.50   • Smokeless tobacco: Never Used   • Tobacco comment: 'quit last year' - smoked 30+ years    Substance and Sexual Activity   • Alcohol use: Not Currently      Comment: soc    • Drug use: No   • Sexual activity: Yes     Partners: Female     Birth control/protection: None      No Known Allergies   Immunization History   Administered Date(s) Administered   • Pneumococcal Conjugate 13-Valent (PCV13) 11/27/2017   • Tdap 11/27/2017        PE:  There were no vitals filed for this visit.   There is no height or weight on file to calculate BMI.      Current Outpatient Medications   Medication Sig Dispense Refill   • ACCU-CHEK SONU PLUS test strip Check glucose daily in the morning. 3 each 0   • aspirin 81 MG chewable tablet Chew 81 mg Daily.     • atorvastatin (LIPITOR) 10 MG tablet Take 10 mg by mouth Daily.     • Blood Glucose Monitoring Suppl (ACCU-CHEK SONU PLUS) w/Device kit Check glucose daily in the morning. 1 kit 0   • busPIRone (BUSPAR) 10 MG tablet Take 1 tablet by mouth 2 (Two) Times a Day. 60 tablet 5   • Lancets (ACCU-CHEK MULTICLIX) lancets Check glucose daily in the morning. 3 each 0   • lisinopril (PRINIVIL,ZESTRIL) 10 MG tablet Take 1 tablet by mouth Daily. 90 tablet 1   • metFORMIN (GLUCOPHAGE) 500 MG tablet Take 1 tablet by mouth 2 (Two) Times a Day With Meals. 120 tablet 5   • montelukast (SINGULAIR) 10 MG tablet Take 1 tablet by mouth Every Night. 30 tablet 5   • tiZANidine (ZANAFLEX) 4 MG tablet Take 4 mg by mouth 2 (Two) Times a Day.     • amoxicillin-clavulanate (Augmentin) 875-125 MG per tablet Take 1 tablet by mouth 2 (Two) Times a Day for 7 days. 14 tablet 0     Current Facility-Administered Medications   Medication Dose Route Frequency Provider Last Rate Last Dose   • ciprofloxacin (CILOXAN) 0.3 % ophthalmic solution 2 drop  2 drop Left Eye Q4H While Awake Eldon Paniagua MD          11 minutes were spent on phone with patient.  Rupert was seen today for migraine and tinnitus.    Diagnoses and all orders for this visit:    Tinnitus of both ears  New problem, requires further work-up.  Unable to rule out ear infection due to phone encounter like a  physical exam.  He had symptoms typical of a sinus infection last week.  Will treat with amoxicillin to cover for infection.  He has a persistent frontal migraine that is quite significant per patient.  He denies history of usual headaches.  We will need to review records at  [facility].  Other migraine without status migrainosus, not intractable  New problem.  Requires further work-up.  Checking head CT.  New onset migraine over age 50.  Other orders  -     amoxicillin-clavulanate (Augmentin) 875-125 MG per tablet; Take 1 tablet by mouth 2 (Two) Times a Day for 7 days.         No follow-ups on file.     Please note that portions of this document were completed with a voice recognition program. Efforts were made to edit the dictations, but occasionally words are mis-transcribed.

## 2020-04-29 ENCOUNTER — APPOINTMENT (OUTPATIENT)
Dept: CT IMAGING | Facility: HOSPITAL | Age: 50
End: 2020-04-29

## 2020-10-16 ENCOUNTER — HOSPITAL ENCOUNTER (OUTPATIENT)
Dept: DIABETES SERVICES | Facility: HOSPITAL | Age: 50
Setting detail: RECURRING SERIES
Discharge: STILL ADMITTED OR ADMINISTRATIVE | End: 2020-12-31
Attending: NURSE PRACTITIONER

## 2021-03-19 ENCOUNTER — OFFICE VISIT CONVERTED (OUTPATIENT)
Dept: SURGERY | Facility: CLINIC | Age: 51
End: 2021-03-19
Attending: NURSE PRACTITIONER

## 2021-05-10 NOTE — H&P
History and Physical      Patient Name: Rupert Briscoe   Patient ID: [id number]   Sex: Male   YOB: 1970    Primary Care Provider: LUCIA SHANNON   Referring Provider: LUCIA SHANNON    Visit Date: March 19, 2021    Provider: PAOLO Malone   Location: [facility]   Location Address: [address] [place] [address]  [id number]   Location Phone: [phone]          Chief Complaint  · Age 50 or over  · Abdominal Pain  · Epigastric Pain  · GERD  · Requesting EGD and Colonoscopy      History Of Present Illness  The patient is a 50 year old /White male presenting to the Surgical Specialist office on a referral from LUCIA SHANNON.   Rupert Briscoe needs to have a diagnostic colonoscopy and EGD.   Patient states that they have had a colonoscopy. 5 years ago   Patient currently complains of: change in BM, GERD, difficulty swallowing, and abdominal pain   Patient Does not have family history of colon cancer.      Patient presents today on referral from Lucia BOONE for epigastric pain, GERD, change in BM and lower abdominal pain.  Patient reports that he has been with GERD and epigastric pain associated with some excessive belching for the last 2 months.  He also reports that he has diarrhea alternating constipation.  Patient is unsure of his family history of colon cancer because he is adopted.  Denies any PPIs.  Uses OTC Tums.    11/16: Colonoscopy ([facility]): normal colon.     9/03: Colonoscopy ([facility]): Splenic flexure - hyperplastic; @50cm - hyperplastic; hemorrhoids; diverticulosis.            Past Medical History  Disease Name Date Onset Notes   Allergic rhinitis, chronic --  --    Bladder disorder --  --    Diabetes --  --    High cholesterol --  --    Hypertension --  --    Mood disorder --  --    Prostate Disorder --  --    Rectal bleeding --  --    Shortness Of Air --  --          Past Surgical History  Procedure Name Date Notes   Colon --  --     Gallbladder --  --    Hernia --  --          Medication List  Name Date Started Instructions   Allegra Allergy 180 mg oral tablet  take 1 tablet (180 mg) by oral route once daily   atorvastatin 10 mg oral tablet  take 1 tablet (10 mg) by oral route once daily   bupropion HCl 300 mg oral tablet extended release 24 hr  take 1 tablet (300 mg) by oral route once daily   lisinopril 5 mg oral tablet  take 1 tablet (5 mg) by oral route once daily   metformin 500 mg oral tablet  take 1 tablet (500 mg) by oral route 2 times per day with morning and evening meals   Miralax 17 gram/dose oral powder 03/19/2021 take as directed. Disp: 238 gm bottle   omega-3 acid ethyl esters 1 gram oral capsule  take 4 capsules (4 gram) by oral route twice daily   oxcarbazepine 150 mg oral tablet  take 2 tablets (300 mg) by oral route 1 time per day         Allergy List  Allergen Name Date Reaction Notes   NO KNOWN DRUG ALLERGIES --  --  --        Allergies Reconciled  Family Medical History  Disease Name Relative/Age Notes   Adopted - no noted history  --          Social History  Finding Status Start/Stop Quantity Notes   Alcohol Former 0/0 --  drinks in the past; wine, beer and liquor   Caffeine Current every day 0/0 --  drinks regularly; 5 or more times per day   Second hand smoke exposure Current some day 0/0 --  yes   Tobacco Current every day 14/0 --  current everyday smoker; started smoking at age 13; smoked 20 cigarette(s) per day         Review of Systems  · Constitutional  o Denies  o : fever, chills  · Eyes  o Denies  o : yellowish discoloration of eyes  · HENT  o Denies  o : difficulty swallowing  · Cardiovascular  o Denies  o : chest pain, chest pain on exertion  · Respiratory  o Denies  o : shortness of breath  · Gastrointestinal  o Admits  o : heartburn, reflux, abdominal pain, narrow stools  o Denies  o : nausea, vomiting, diarrhea, constipation  · Genitourinary  o Denies  o : abnormal color of  urine  · Integument  o Denies  o : rash  · Neurologic  o Denies  o : tingling or numbness  · Musculoskeletal  o Denies  o : joint pain  · Endocrine  o Denies  o : weight gain, weight loss      Vitals  Date Time BP Position Site L\R Cuff Size HR RR TEMP (F) WT  HT  BMI kg/m2 BSA m2 O2 Sat FR L/min FiO2 HC       03/19/2021 11:47 AM       18  269lbs 2oz 6'   36.5 2.49             Physical Examination  · Constitutional  o Appearance  o : well developed, well-nourished, patient in no apparent distress  · Head and Face  o Head  o :   § Inspection  § : atraumatic, normocephalic  o Face  o :   § Inspection  § : no facial lesions  · Eyes  o Conjunctivae  o : conjunctivae normal  o Sclerae  o : sclerae white  · Neck  o Inspection/Palpation  o : normal appearance, no masses or tenderness, trachea midline  · Respiratory  o Respiratory Effort  o : breathing unlabored  · Skin and Subcutaneous Tissue  o General Inspection  o : no lesions present, no areas of discoloration, skin turgor normal, texture normal  · Neurologic  o Mental Status Examination  o :   § Orientation  § : grossly oriented to person, place and time  § Attention  § : attention normal, concentration abilities normal  § Fund of Knowledge  § : fund of knowledge within normal limits, patient aware of current events  o Gait and Station  o : normal gait, able to stand without difficulty  · Psychiatric  o Judgement and Insight  o : judgment and insight intact  o Mood and Affect  o : mood normal, affect appropriate              Assessment  · Abdominal Pain, Epigastric     789.06/R10.13  · Abdominal Pain, Generalized     789.07/R10.84  · GERD (gastroesophageal reflux disease)     530.81/K21.9  · Change in bowel habits     787.99/R19.4  · Pre-op testing     V72.84/Z01.818    Problems Reconciled  Plan  · Orders  o Consent for Colonoscopy with Possible Biopsy - Possible risks/complications, benefits, and alternatives to surgical or invasive procedure have been explained to  patient and/or legal guardian. -Patient has been evaluated and can tolerate anesthesia and/or sedation. Risks, benefits, and alternatives to anesthesia and sedation have been explained to patient and/or legal guardian. ([id number]) - 789.07/R10.84, 787.99/R19.4 - 05/17/2021  o Consent for Esophagogastroduodenoscopy (EGD) with dilation - Possible risks/complications, benefits, and alternatives to surgical or invasive procedure have been explained to patient and/or legal guardian. - Patient has been evaluated and can tolerate anesthesia and/or sedation. Risks, benefits, and alternatives to anesthesia and sedation have been explained to patient and/or legal guardian. (62643) - 789.06/R10.13, 530.81/K21.9 - 05/17/2021  o [facility] Pre-Op Covid-19 Screening ([id number]) - V72.84/Z01.818 - 05/12/2021   [address]  · Medications  o Medications have been Reconciled  o Transition of Care or Provider Policy  · Instructions  o Surgical Facility:   o Handouts Provided Pre-Procedure Instructions including date, time, and location of procedure.   o PLAN: Proceeed with colonoscopy. Patient understands risks/benefits and is willing to proceed.   o PLAN: Proceeed with EGD. Patient understands risks/benefits and is willing to proceed.   o ***Surgical Orders***  o RISK AND BENEFITS:  o Given these options, the patient has verbally expressed an understanding of the risks of the surgery and finds these risks acceptable. Will proceed with surgery as soon as possible.  o O.R. PREP: Per protocol   o IV: Per Anesthesia  o Please sign permit for: Colonoscopy with possible biopsies by Dr. Ortiz.  o Please sign permit for: Esophagogastroduodenoscopy with possible biopsies and dilation by Dr. Ortiz.  o The above History and Physical Examination has been completed within 30 days of admission.  o ***Patient Status***  o Outpatient  o Follow up in the in the office post procedure.  o Electronically Identified Patient  Education Materials Provided Electronically  · Disposition  o Call or Return if symptoms worsen or persist.  · Referrals  o ID: [id number] Date: 03/10/2021 Type: Inbound  Specialty: General Surgery            Electronically Signed by: PAOLO Malone -Author on March 19, 2021 01:27:40 PM

## 2021-05-12 ENCOUNTER — HOSPITAL ENCOUNTER (OUTPATIENT)
Dept: PREADMISSION TESTING | Facility: HOSPITAL | Age: 51
Discharge: HOME | End: 2021-05-12
Attending: SURGERY

## 2021-05-13 LAB — SARS-COV-2 RNA SPEC QL NAA+PROBE: NOT DETECTED

## 2021-05-14 VITALS — RESPIRATION RATE: 18 BRPM | BODY MASS INDEX: 36.45 KG/M2 | HEIGHT: 72 IN | WEIGHT: 269.12 LBS

## 2021-05-17 ENCOUNTER — HOSPITAL ENCOUNTER (OUTPATIENT)
Dept: GASTROENTEROLOGY | Facility: HOSPITAL | Age: 51
Setting detail: HOSPITAL OUTPATIENT SURGERY
Discharge: HOME | End: 2021-05-17
Attending: SURGERY

## 2021-05-17 LAB — GLUCOSE BLD-MCNC: 179 MG/DL (ref 70–99)

## 2021-06-02 ENCOUNTER — OFFICE VISIT CONVERTED (OUTPATIENT)
Dept: SURGERY | Facility: CLINIC | Age: 51
End: 2021-06-02
Attending: NURSE PRACTITIONER

## 2021-06-05 NOTE — PROGRESS NOTES
Progress Note      Patient Name: Rupert Briscoe   Patient ID: [id number]   Sex: Male   YOB: 1970    Primary Care Provider: DEWAYNE SHANNON   Referring Provider: DEWAYNE SHANNON    Visit Date: June 2, 2021    Provider: PAOLO Malone   Location: [facility]   Location Address: [address] [place] [address]  [id number]   Location Phone: [phone]          Chief Complaint  · Follow Up Colonoscopy      History Of Present Illness  Rupert Briscoe is a 50 year old /White male who is here to follow up colonoscopy.      She presents today for follow-up visit after undergoing a EGD and a colonoscopy on 5/17/2021 performed by Dr. Tad Ortiz.  Patient was with a negative EGD per EGD/pathology.  Patient was with a hyperplastic polyp of the descending colon and a sessile serrated polyp of the ascending colon per colonoscopy/pathology.  Patient denies any postoperative complications.       Past Medical History  Disease Name Date Onset Notes   Allergic rhinitis, chronic --  --    Bladder disorder --  --    Diabetes --  --    High cholesterol --  --    Hypertension --  --    Mood disorder --  --    Prostate Disorder --  --    Rectal bleeding --  --    Shortness Of Air --  --          Past Surgical History  Procedure Name Date Notes   Colon --  --    Gallbladder --  --    Hernia --  --          Medication List  Name Date Started Instructions   Allegra Allergy 180 mg oral tablet  take 1 tablet (180 mg) by oral route once daily   atorvastatin 10 mg oral tablet  take 1 tablet (10 mg) by oral route once daily   bupropion HCl 300 mg oral tablet extended release 24 hr  take 1 tablet (300 mg) by oral route once daily   lisinopril 5 mg oral tablet  take 1 tablet (5 mg) by oral route once daily   metformin 500 mg oral tablet  take 1 tablet (500 mg) by oral route 2 times per day with morning and evening meals   Miralax 17 gram/dose oral powder 03/19/2021 take as directed. Disp: 238  gm bottle   omega-3 acid ethyl esters 1 gram oral capsule  take 4 capsules (4 gram) by oral route twice daily   oxcarbazepine 150 mg oral tablet  take 2 tablets (300 mg) by oral route 1 time per day         Allergy List  Allergen Name Date Reaction Notes   NO KNOWN DRUG ALLERGIES --  --  --        Allergies Reconciled  Family Medical History  Disease Name Relative/Age Notes   Adopted - no noted history  --          Social History  Finding Status Start/Stop Quantity Notes   Alcohol Former 0/0 --  drinks in the past; wine, beer and liquor   Caffeine Current every day 0/0 --  drinks regularly; 5 or more times per day   Second hand smoke exposure Current some day 0/0 --  yes   Tobacco Former 14/0 --  current everyday smoker; started smoking at age 13; smoked 20 cigarette(s) per day 6/2/2021 PT REPORTS HE ONLY VAPES    Vapes Current every day --/-- --  6/2/2021 PT REPORTS HE USES SEVERAL TANKS PER DAY          Review of Systems  · Constitutional  o Denies  o : chills, fever  · Eyes  o Denies  o : yellowish discoloration of eyes  · HENT  o Denies  o : difficulty swallowing  · Cardiovascular  o Denies  o : chest pain on exertion  · Respiratory  o Denies  o : shortness of breath  · Gastrointestinal  o Denies  o : nausea, vomiting, diarrhea, constipation  · Genitourinary  o Denies  o : abnormal color of urine  · Integument  o Denies  o : rash  · Neurologic  o Denies  o : tingling or numbness  · Musculoskeletal  o Denies  o : joint pain  · Endocrine  o Denies  o : weight gain, weight loss      Vitals  Date Time BP Position Site L\R Cuff Size HR RR TEMP (F) WT  HT  BMI kg/m2 BSA m2 O2 Sat FR L/min FiO2 HC       06/02/2021 09:40 AM         275lbs 0oz 6'   37.3 2.52             Physical Examination  · Constitutional  o Appearance  o : well developed, well-nourished, patient in no apparent distress  · Head and Face  o Head  o :   § Inspection  § : atraumatic, normocephalic  o Face  o :   § Inspection  § : no facial  lesions  · Eyes  o Conjunctivae  o : conjunctivae normal  o Sclerae  o : sclerae white  · Neck  o Inspection/Palpation  o : normal appearance, no masses or tenderness, trachea midline  · Respiratory  o Respiratory Effort  o : breathing unlabored  · Skin and Subcutaneous Tissue  o General Inspection  o : no lesions present, no areas of discoloration, skin turgor normal, texture normal  · Neurologic  o Mental Status Examination  o :   § Orientation  § : grossly oriented to person, place and time  § Attention  § : attention normal, concentration abilities normal  § Fund of Knowledge  § : fund of knowledge within normal limits, patient aware of current events  o Gait and Station  o : normal gait, able to stand without difficulty  · Psychiatric  o Judgement and Insight  o : judgment and insight intact  o Mood and Affect  o : mood normal, affect appropriate              Assessment  · Hyperplastic colon polyp     211.3/K63.5  · Sessile colonic polyp     211.3/K63.5  · S/P colonoscopic polypectomy     V45.89/Z98.890  · S/P endoscopy     V45.89/Z98.890    Problems Reconciled  Plan  · Medications  o Medications have been Reconciled  o Transition of Care or Provider Policy  · Instructions  o Per the AGA guidelines of 2012 patient is to follow up for colonoscopy surveillance  o Rescreen: In 3 years. Follow-up in the interim as needed.  o Electronically Identified Patient Education Materials Provided Electronically  · Disposition  o Call or Return if symptoms worsen or persist.  o EMR dragon/transcription disclaimer: Much of this encounter note is an electronic transcription/translation of spoken language to printed text. Electronic translation of spoken language may permit erroneous, or at times nonsensical words or phrases to be inadvertently trasncribed; although I have reviewed the note for such errors, some may still exist.            Electronically Signed by: PAOLO Malone -Author on June 2, 2021 10:01:46 AM

## 2021-07-15 VITALS — BODY MASS INDEX: 37.25 KG/M2 | HEIGHT: 72 IN | WEIGHT: 275 LBS

## 2021-09-20 ENCOUNTER — HOSPITAL ENCOUNTER (EMERGENCY)
Facility: HOSPITAL | Age: 51
Discharge: LEFT BEFORE DISCHARGE | End: 2021-09-21

## 2021-09-20 VITALS
SYSTOLIC BLOOD PRESSURE: 139 MMHG | OXYGEN SATURATION: 98 % | HEART RATE: 92 BPM | WEIGHT: 283.29 LBS | HEIGHT: 72 IN | DIASTOLIC BLOOD PRESSURE: 83 MMHG | RESPIRATION RATE: 20 BRPM | TEMPERATURE: 98.3 F | BODY MASS INDEX: 38.37 KG/M2

## 2021-09-20 PROCEDURE — 99211 OFF/OP EST MAY X REQ PHY/QHP: CPT

## 2021-11-05 ENCOUNTER — TRANSCRIBE ORDERS (OUTPATIENT)
Dept: ADMINISTRATIVE | Facility: HOSPITAL | Age: 51
End: 2021-11-05

## 2021-11-05 DIAGNOSIS — F17.210 CIGARETTE SMOKER: Primary | ICD-10-CM

## 2022-03-21 RX ORDER — DIVALPROEX SODIUM 500 MG/1
TABLET, DELAYED RELEASE ORAL
COMMUNITY
End: 2022-04-13

## 2022-03-21 RX ORDER — OXCARBAZEPINE 150 MG/1
TABLET, FILM COATED ORAL
COMMUNITY
End: 2022-04-13

## 2022-03-21 RX ORDER — ATORVASTATIN CALCIUM 10 MG/1
10 TABLET, FILM COATED ORAL DAILY
COMMUNITY
Start: 2021-06-10 | End: 2022-04-13

## 2022-03-21 RX ORDER — BUSPIRONE HYDROCHLORIDE 10 MG/1
TABLET ORAL
COMMUNITY
End: 2022-04-13

## 2022-03-21 RX ORDER — LISINOPRIL 5 MG/1
5 TABLET ORAL DAILY
COMMUNITY
Start: 2021-06-10 | End: 2022-06-10

## 2022-03-21 RX ORDER — CHLORAL HYDRATE 500 MG
2 CAPSULE ORAL DAILY
COMMUNITY
Start: 2021-06-10

## 2022-03-21 RX ORDER — POLYETHYLENE GLYCOL 3350 17 G/17G
POWDER, FOR SOLUTION ORAL
COMMUNITY
Start: 2021-03-19 | End: 2022-04-13

## 2022-03-21 RX ORDER — LITHIUM CARBONATE 300 MG
TABLET ORAL
COMMUNITY
End: 2022-04-13

## 2022-03-21 RX ORDER — BUPROPION HYDROCHLORIDE 300 MG/1
TABLET ORAL
COMMUNITY
End: 2022-04-13

## 2022-03-22 ENCOUNTER — PREP FOR SURGERY (OUTPATIENT)
Dept: OTHER | Facility: HOSPITAL | Age: 52
End: 2022-03-22

## 2022-03-22 ENCOUNTER — OFFICE VISIT (OUTPATIENT)
Dept: SURGERY | Facility: CLINIC | Age: 52
End: 2022-03-22

## 2022-03-22 VITALS — RESPIRATION RATE: 20 BRPM | WEIGHT: 270 LBS | BODY MASS INDEX: 36.57 KG/M2 | HEIGHT: 72 IN

## 2022-03-22 DIAGNOSIS — K42.9 UMBILICAL HERNIA WITHOUT OBSTRUCTION AND WITHOUT GANGRENE: Primary | ICD-10-CM

## 2022-03-22 PROCEDURE — 99214 OFFICE O/P EST MOD 30 MIN: CPT | Performed by: SURGERY

## 2022-03-22 RX ORDER — SODIUM CHLORIDE 0.9 % (FLUSH) 0.9 %
10 SYRINGE (ML) INJECTION AS NEEDED
Status: CANCELLED | OUTPATIENT
Start: 2022-03-22

## 2022-03-22 RX ORDER — SODIUM CHLORIDE, SODIUM LACTATE, POTASSIUM CHLORIDE, CALCIUM CHLORIDE 600; 310; 30; 20 MG/100ML; MG/100ML; MG/100ML; MG/100ML
70 INJECTION, SOLUTION INTRAVENOUS CONTINUOUS
Status: CANCELLED | OUTPATIENT
Start: 2022-03-22

## 2022-03-22 RX ORDER — OMEGA-3-ACID ETHYL ESTERS 1 G/1
CAPSULE, LIQUID FILLED ORAL
COMMUNITY
Start: 2022-03-02 | End: 2022-04-13

## 2022-03-22 RX ORDER — ONDANSETRON 2 MG/ML
4 INJECTION INTRAMUSCULAR; INTRAVENOUS EVERY 6 HOURS PRN
Status: CANCELLED | OUTPATIENT
Start: 2022-03-22

## 2022-03-22 RX ORDER — SODIUM CHLORIDE 0.9 % (FLUSH) 0.9 %
10 SYRINGE (ML) INJECTION EVERY 12 HOURS SCHEDULED
Status: CANCELLED | OUTPATIENT
Start: 2022-03-22

## 2022-03-22 RX ORDER — GLIMEPIRIDE 2 MG/1
2 TABLET ORAL
COMMUNITY
Start: 2022-03-03

## 2022-03-22 RX ORDER — ONDANSETRON 4 MG/1
TABLET, FILM COATED ORAL
COMMUNITY
Start: 2021-12-22 | End: 2022-04-13

## 2022-03-22 RX ORDER — FEXOFENADINE HCL 180 MG/1
180 TABLET ORAL DAILY
COMMUNITY

## 2022-03-22 RX ORDER — NAPROXEN 500 MG/1
500 TABLET ORAL
COMMUNITY
Start: 2021-10-26 | End: 2022-04-13

## 2022-03-22 RX ORDER — CEFAZOLIN SODIUM 2 G/100ML
2 INJECTION, SOLUTION INTRAVENOUS ONCE
Status: CANCELLED | OUTPATIENT
Start: 2022-03-22 | End: 2022-03-22

## 2022-03-22 RX ORDER — RISPERIDONE 0.5 MG/1
TABLET ORAL
COMMUNITY
Start: 2022-02-28 | End: 2022-04-13

## 2022-03-22 RX ORDER — OXCARBAZEPINE 300 MG/1
300 TABLET, FILM COATED ORAL 2 TIMES DAILY
COMMUNITY
Start: 2022-02-28

## 2022-03-22 RX ORDER — ERGOCALCIFEROL 1.25 MG/1
50000 CAPSULE ORAL
COMMUNITY
Start: 2021-12-06 | End: 2022-04-13

## 2022-03-22 NOTE — PROGRESS NOTES
Inpatient History and Physical Surgical Orders    Preadmission Location:   Preadmission Time:  Facility:  Surgery Date:  Surgery Time:  Preadmission Test date:     Chief Complaint  Outpatient History and Physical / Surgical Orders    Primary Care Provider: Kelvin Prescott NP    Referring Provider: Kelvin Prescott NP    Subjective      Patient Name: Rupert Briscoe : 1970    HPI  The patient is a 51-year-old gentleman who I have taken care of in the past.  He has a symptomatic umbilical hernia and would like to have that repaired.    Past History:  Medical History: has a past medical history of Allergic, Anxiety, Colon polyp, Depression, Diabetes mellitus (HCC), Diverticulosis, Hernia of abdominal wall, Hyperlipidemia, Hypertension, Migraine, Pancreatitis, Prostatitis, Shortness of breath, Sinusitis, and Urinary tract infection.   Surgical History: has a past surgical history that includes Mandible fracture surgery; Tonsillectomy; Cholecystectomy; Hernia repair; Hemorrhoid surgery; Colon surgery; and Appendectomy.   Family History: family history is not on file. He was adopted.   Social History: reports that he has quit smoking. He smoked 0.50 packs per day. He has never used smokeless tobacco. He reports previous alcohol use. He reports that he does not use drugs.  Allergies: Patient has no known allergies.       Current Outpatient Medications:   •  ACCU-CHEK SONU PLUS test strip, Check glucose daily in the morning., Disp: 3 each, Rfl: 0  •  aspirin 81 MG chewable tablet, Chew 81 mg Daily., Disp: , Rfl:   •  atorvastatin (LIPITOR) 10 MG tablet, Take 10 mg by mouth Daily., Disp: , Rfl:   •  atorvastatin (LIPITOR) 10 MG tablet, Take 10 mg by mouth Daily., Disp: , Rfl:   •  Blood Glucose Monitoring Suppl (ACCU-CHEK SONU PLUS) w/Device kit, Check glucose daily in the morning., Disp: 1 kit, Rfl: 0  •  buPROPion XL (WELLBUTRIN XL) 300 MG 24 hr tablet, bupropion HCl 300 mg oral tablet extended release 24 hr take  1 tablet (300 mg) by oral route once daily   Active, Disp: , Rfl:   •  busPIRone (BUSPAR) 10 MG tablet, Take 1 tablet by mouth 2 (Two) Times a Day., Disp: 60 tablet, Rfl: 5  •  busPIRone (BUSPAR) 10 MG tablet, buspirone 10 mg oral tablet take 1 tablet (10 mg) by oral route 2 times per day   Suspended, Disp: , Rfl:   •  divalproex (DEPAKOTE) 500 MG DR tablet, , Disp: , Rfl:   •  ergocalciferol (ERGOCALCIFEROL) 1.25 MG (67500 UT) capsule, Take 50,000 Units by mouth., Disp: , Rfl:   •  fexofenadine (ALLEGRA) 180 MG tablet, Take 180 mg by mouth Daily., Disp: , Rfl:   •  Fexofenadine HCl (ALLEGRA ALLERGY PO), Allegra Allergy 180 mg oral tablet take 1 tablet (180 mg) by oral route once daily   Active, Disp: , Rfl:   •  glimepiride (AMARYL) 2 MG tablet, , Disp: , Rfl:   •  Lancets (ACCU-CHEK MULTICLIX) lancets, Check glucose daily in the morning., Disp: 3 each, Rfl: 0  •  lisinopril (PRINIVIL,ZESTRIL) 10 MG tablet, Take 1 tablet by mouth Daily., Disp: 90 tablet, Rfl: 1  •  lisinopril (PRINIVIL,ZESTRIL) 5 MG tablet, Take 5 mg by mouth Daily., Disp: , Rfl:   •  lithium 300 MG tablet, lithium carbonate 300 mg oral tablet take 1 tablet by oral route 2 times a day   Suspended, Disp: , Rfl:   •  metFORMIN (GLUCOPHAGE) 500 MG tablet, Take 1 tablet by mouth 2 (Two) Times a Day With Meals., Disp: 120 tablet, Rfl: 5  •  metFORMIN (GLUCOPHAGE) 500 MG tablet, Take 1,000 mg by mouth., Disp: , Rfl:   •  metFORMIN (GLUCOPHAGE) 500 MG tablet, Take 1,000 mg by mouth., Disp: , Rfl:   •  montelukast (SINGULAIR) 10 MG tablet, Take 1 tablet by mouth Every Night., Disp: 30 tablet, Rfl: 5  •  naproxen (NAPROSYN) 500 MG tablet, Take 500 mg by mouth., Disp: , Rfl:   •  omega-3 acid ethyl esters (LOVAZA) 1 g capsule, , Disp: , Rfl:   •  Omega-3 Fatty Acids (fish oil) 1000 MG capsule capsule, Take 2 g by mouth Daily., Disp: , Rfl:   •  ondansetron (ZOFRAN) 4 MG tablet, , Disp: , Rfl:   •  OXcarbazepine (TRILEPTAL) 150 MG tablet, oxcarbazepine 150 mg  "oral tablet take 2 tablets (300 mg) by oral route 1 time per day   Active, Disp: , Rfl:   •  OXcarbazepine (TRILEPTAL) 300 MG tablet, , Disp: , Rfl:   •  polyethylene glycol (MIRALAX) 17 GM/SCOOP powder, Miralax 17 gram/dose oral powder take as directed. Disp: 238 gm bottle 3/19/2021  Active, Disp: , Rfl:   •  risperiDONE (risperDAL) 0.5 MG tablet, , Disp: , Rfl:   •  tiZANidine (ZANAFLEX) 4 MG tablet, Take 4 mg by mouth 2 (Two) Times a Day., Disp: , Rfl:     Current Facility-Administered Medications:   •  ciprofloxacin (CILOXAN) 0.3 % ophthalmic solution 2 drop, 2 drop, Left Eye, Q4H While Awake, Eldon Paniagua MD       Objective   Vital Signs:   Resp 20   Ht 182.9 cm (72\")   Wt 122 kg (270 lb)   BMI 36.62 kg/m²       Physical Exam  Vitals and nursing note reviewed.   Constitutional:       Appearance: Normal appearance. The patient is well-developed.   Cardiovascular:      Rate and Rhythm: Normal rate and regular rhythm.   Pulmonary:      Effort: Pulmonary effort is normal.      Breath sounds: Normal air entry.   Abdominal:      General: Bowel sounds are normal.      Palpations: Abdomen is soft.  Umbilical hernia noted     Skin:     General: Skin is warm and dry.   Neurological:      Mental Status: The patient is alert and oriented to person, place, and time.      Motor: Motor function is intact.   Psychiatric:         Mood and Affect: Mood normal.       Result Review :               Assessment and Plan   Diagnoses and all orders for this visit:    1. Umbilical hernia without obstruction and without gangrene (Primary)    We will schedule him for a laparoscopic umbilical hernia repair.  I have described the procedure to him as well as the risk and benefits and he is agreeable to proceeding.    I  Tad Ortiz MD  03/22/2022    "

## 2022-04-13 NOTE — PRE-PROCEDURE INSTRUCTIONS
PATIENT INSTRUCTED TO BE:    - NPO AFTER MIDNIGHT EXCEPT CAN HAVE CLEAR LIQUIDS 2 HOURS PRIOR TO SURGERY ARRIVAL TIME     - TO HOLD ALL VITAMINS, SUPPLEMENTS, NSAIDS FOR ONE WEEK PRIOR TO THEIR SURGICAL PROCEDURE    - INSTRUCTED PT TO USE SURGICAL SOAP 1 TIME THE NIGHT PRIOR TO SURGERY OR THE AM OF SURGERY.   USE SOAP FROM NECK TO TOES AVOID THEIR FACE, HAIR, AND PRIVATE PARTS. INSTRUCTED NO LOTIONS, JEWELRY, PIERCINGS, OR DEODORANT DAY OF SURGERY    - IF DIABETIC, CHECK BLOOD GLUCOSE IF LESS THAN 70 CALL PREOP AREA -AM OF SURGERY     - INSTRUCTED TO TAKE THE FOLLOWING MEDICATIONS THE DAY OF SURGERY:       LIPTIOR, ALLEGRA, OXCARBEZEPINE,    TAKE METFORMIN BY 6PM THE NIGHT PRIOR TO SURGERY     PATIENT VERBALIZED UNDERSTANDING, PT STATED HE WAS UNSURE IF HE WOULD TAKE ANY OF HIS MEDS DAY OF SURGERY, INSTRUCTED PT HE CAN TAKE THE MEDICATIONS MENTIONED ABOVE DAY OF SURGERY.

## 2022-04-20 ENCOUNTER — ANESTHESIA EVENT (OUTPATIENT)
Dept: PERIOP | Facility: HOSPITAL | Age: 52
End: 2022-04-20

## 2022-04-20 ENCOUNTER — HOSPITAL ENCOUNTER (OUTPATIENT)
Facility: HOSPITAL | Age: 52
Setting detail: HOSPITAL OUTPATIENT SURGERY
Discharge: HOME | End: 2022-04-20
Attending: SURGERY | Admitting: SURGERY

## 2022-04-20 ENCOUNTER — ANESTHESIA (OUTPATIENT)
Dept: PERIOP | Facility: HOSPITAL | Age: 52
End: 2022-04-20

## 2022-04-20 VITALS
SYSTOLIC BLOOD PRESSURE: 115 MMHG | DIASTOLIC BLOOD PRESSURE: 72 MMHG | HEART RATE: 78 BPM | OXYGEN SATURATION: 95 % | HEIGHT: 72 IN | RESPIRATION RATE: 15 BRPM | TEMPERATURE: 98.1 F | WEIGHT: 271.17 LBS | BODY MASS INDEX: 36.73 KG/M2

## 2022-04-20 DIAGNOSIS — K42.9 UMBILICAL HERNIA WITHOUT OBSTRUCTION AND WITHOUT GANGRENE: ICD-10-CM

## 2022-04-20 LAB
ANION GAP SERPL CALCULATED.3IONS-SCNC: 9.7 MMOL/L (ref 5–15)
BUN SERPL-MCNC: 14 MG/DL (ref 6–20)
BUN/CREAT SERPL: 12.6 (ref 7–25)
CALCIUM SPEC-SCNC: 9.6 MG/DL (ref 8.6–10.5)
CHLORIDE SERPL-SCNC: 104 MMOL/L (ref 98–107)
CO2 SERPL-SCNC: 26.3 MMOL/L (ref 22–29)
CREAT SERPL-MCNC: 1.11 MG/DL (ref 0.76–1.27)
EGFRCR SERPLBLD CKD-EPI 2021: 79.9 ML/MIN/1.73
GLUCOSE BLDC GLUCOMTR-MCNC: 218 MG/DL (ref 70–99)
GLUCOSE SERPL-MCNC: 165 MG/DL (ref 65–99)
POTASSIUM SERPL-SCNC: 4.6 MMOL/L (ref 3.5–5.2)
QT INTERVAL: 387 MS
SODIUM SERPL-SCNC: 140 MMOL/L (ref 136–145)

## 2022-04-20 PROCEDURE — 49652 PR LAP, VENTRAL HERNIA REPAIR,REDUCIBLE: CPT | Performed by: SPECIALIST/TECHNOLOGIST, OTHER

## 2022-04-20 PROCEDURE — 80048 BASIC METABOLIC PNL TOTAL CA: CPT | Performed by: ANESTHESIOLOGY

## 2022-04-20 PROCEDURE — 49652 PR LAP, VENTRAL HERNIA REPAIR,REDUCIBLE: CPT | Performed by: SURGERY

## 2022-04-20 PROCEDURE — 25010000002 ONDANSETRON PER 1 MG: Performed by: SURGERY

## 2022-04-20 PROCEDURE — 25010000002 CEFAZOLIN IN DEXTROSE 2-4 GM/100ML-% SOLUTION: Performed by: SURGERY

## 2022-04-20 PROCEDURE — 82962 GLUCOSE BLOOD TEST: CPT

## 2022-04-20 PROCEDURE — C1889 IMPLANT/INSERT DEVICE, NOC: HCPCS | Performed by: SURGERY

## 2022-04-20 PROCEDURE — 25010000002 HYDROMORPHONE 1 MG/ML SOLUTION: Performed by: NURSE ANESTHETIST, CERTIFIED REGISTERED

## 2022-04-20 PROCEDURE — C1781 MESH (IMPLANTABLE): HCPCS | Performed by: SURGERY

## 2022-04-20 PROCEDURE — 25010000002 ONDANSETRON PER 1 MG: Performed by: NURSE ANESTHETIST, CERTIFIED REGISTERED

## 2022-04-20 PROCEDURE — 25010000002 KETOROLAC TROMETHAMINE PER 15 MG: Performed by: NURSE ANESTHETIST, CERTIFIED REGISTERED

## 2022-04-20 PROCEDURE — 25010000002 DEXAMETHASONE PER 1 MG: Performed by: NURSE ANESTHETIST, CERTIFIED REGISTERED

## 2022-04-20 PROCEDURE — 25010000002 PROPOFOL 10 MG/ML EMULSION: Performed by: NURSE ANESTHETIST, CERTIFIED REGISTERED

## 2022-04-20 PROCEDURE — 0 MEPERIDINE PER 100 MG: Performed by: NURSE ANESTHETIST, CERTIFIED REGISTERED

## 2022-04-20 PROCEDURE — 25010000002 FENTANYL CITRATE (PF) 50 MCG/ML SOLUTION: Performed by: NURSE ANESTHETIST, CERTIFIED REGISTERED

## 2022-04-20 PROCEDURE — 93010 ELECTROCARDIOGRAM REPORT: CPT | Performed by: INTERNAL MEDICINE

## 2022-04-20 PROCEDURE — 93005 ELECTROCARDIOGRAM TRACING: CPT | Performed by: ANESTHESIOLOGY

## 2022-04-20 PROCEDURE — 25010000002 MIDAZOLAM PER 1 MG: Performed by: ANESTHESIOLOGY

## 2022-04-20 DEVICE — VENTRALIGHT ST MESH WITH ECHO PS POSITONING SYSTEM
Type: IMPLANTABLE DEVICE | Site: ABDOMEN | Status: FUNCTIONAL
Brand: VENTRALIGHT ST MESH WITH ECHO PS POSITONING SYSTEM

## 2022-04-20 DEVICE — OPTIFIX AT ABSORBABLE FIXATION SYSTEM WITH ARTICULATING TECHNOLOGY - 30 ABSORBABLE FASTENERS
Type: IMPLANTABLE DEVICE | Site: ABDOMEN | Status: FUNCTIONAL
Brand: OPTIFIX AT ABSORBABLE FIXATION SYSTEM WITH ARTICULATING TECHNOLOGY

## 2022-04-20 RX ORDER — MAGNESIUM HYDROXIDE 1200 MG/15ML
LIQUID ORAL AS NEEDED
Status: DISCONTINUED | OUTPATIENT
Start: 2022-04-20 | End: 2022-04-20 | Stop reason: HOSPADM

## 2022-04-20 RX ORDER — HYDROCODONE BITARTRATE AND ACETAMINOPHEN 5; 325 MG/1; MG/1
1 TABLET ORAL EVERY 6 HOURS PRN
Qty: 10 TABLET | Refills: 0 | Status: SHIPPED | OUTPATIENT
Start: 2022-04-20

## 2022-04-20 RX ORDER — IBUPROFEN 600 MG/1
600 TABLET ORAL EVERY 6 HOURS PRN
Status: DISCONTINUED | OUTPATIENT
Start: 2022-04-20 | End: 2022-04-20 | Stop reason: HOSPADM

## 2022-04-20 RX ORDER — ACETAMINOPHEN 500 MG
1000 TABLET ORAL ONCE
Status: COMPLETED | OUTPATIENT
Start: 2022-04-20 | End: 2022-04-20

## 2022-04-20 RX ORDER — BUPIVACAINE HYDROCHLORIDE AND EPINEPHRINE 2.5; 5 MG/ML; UG/ML
INJECTION, SOLUTION EPIDURAL; INFILTRATION; INTRACAUDAL; PERINEURAL AS NEEDED
Status: DISCONTINUED | OUTPATIENT
Start: 2022-04-20 | End: 2022-04-20 | Stop reason: HOSPADM

## 2022-04-20 RX ORDER — SODIUM CHLORIDE, SODIUM LACTATE, POTASSIUM CHLORIDE, CALCIUM CHLORIDE 600; 310; 30; 20 MG/100ML; MG/100ML; MG/100ML; MG/100ML
9 INJECTION, SOLUTION INTRAVENOUS CONTINUOUS PRN
Status: DISCONTINUED | OUTPATIENT
Start: 2022-04-20 | End: 2022-04-20 | Stop reason: HOSPADM

## 2022-04-20 RX ORDER — PROMETHAZINE HYDROCHLORIDE 25 MG/1
25 SUPPOSITORY RECTAL ONCE AS NEEDED
Status: DISCONTINUED | OUTPATIENT
Start: 2022-04-20 | End: 2022-04-20 | Stop reason: HOSPADM

## 2022-04-20 RX ORDER — FENTANYL CITRATE 50 UG/ML
INJECTION, SOLUTION INTRAMUSCULAR; INTRAVENOUS AS NEEDED
Status: DISCONTINUED | OUTPATIENT
Start: 2022-04-20 | End: 2022-04-20 | Stop reason: SURG

## 2022-04-20 RX ORDER — KETOROLAC TROMETHAMINE 30 MG/ML
INJECTION, SOLUTION INTRAMUSCULAR; INTRAVENOUS AS NEEDED
Status: DISCONTINUED | OUTPATIENT
Start: 2022-04-20 | End: 2022-04-20 | Stop reason: SURG

## 2022-04-20 RX ORDER — PROMETHAZINE HYDROCHLORIDE 12.5 MG/1
25 TABLET ORAL ONCE AS NEEDED
Status: DISCONTINUED | OUTPATIENT
Start: 2022-04-20 | End: 2022-04-20 | Stop reason: HOSPADM

## 2022-04-20 RX ORDER — CEFAZOLIN SODIUM 2 G/100ML
2 INJECTION, SOLUTION INTRAVENOUS ONCE
Status: COMPLETED | OUTPATIENT
Start: 2022-04-20 | End: 2022-04-20

## 2022-04-20 RX ORDER — HYDROCODONE BITARTRATE AND ACETAMINOPHEN 5; 325 MG/1; MG/1
1 TABLET ORAL ONCE AS NEEDED
Status: DISCONTINUED | OUTPATIENT
Start: 2022-04-20 | End: 2022-04-20 | Stop reason: HOSPADM

## 2022-04-20 RX ORDER — ONDANSETRON 2 MG/ML
4 INJECTION INTRAMUSCULAR; INTRAVENOUS ONCE AS NEEDED
Status: COMPLETED | OUTPATIENT
Start: 2022-04-20 | End: 2022-04-20

## 2022-04-20 RX ORDER — DEXMEDETOMIDINE HYDROCHLORIDE 100 UG/ML
INJECTION, SOLUTION INTRAVENOUS AS NEEDED
Status: DISCONTINUED | OUTPATIENT
Start: 2022-04-20 | End: 2022-04-20 | Stop reason: SURG

## 2022-04-20 RX ORDER — ROCURONIUM BROMIDE 10 MG/ML
INJECTION, SOLUTION INTRAVENOUS AS NEEDED
Status: DISCONTINUED | OUTPATIENT
Start: 2022-04-20 | End: 2022-04-20 | Stop reason: SURG

## 2022-04-20 RX ORDER — MEPERIDINE HYDROCHLORIDE 25 MG/ML
12.5 INJECTION INTRAMUSCULAR; INTRAVENOUS; SUBCUTANEOUS
Status: DISCONTINUED | OUTPATIENT
Start: 2022-04-20 | End: 2022-04-20 | Stop reason: HOSPADM

## 2022-04-20 RX ORDER — SODIUM CHLORIDE 0.9 % (FLUSH) 0.9 %
10 SYRINGE (ML) INJECTION AS NEEDED
Status: DISCONTINUED | OUTPATIENT
Start: 2022-04-20 | End: 2022-04-20 | Stop reason: HOSPADM

## 2022-04-20 RX ORDER — OXYCODONE HYDROCHLORIDE 5 MG/1
5 TABLET ORAL
Status: COMPLETED | OUTPATIENT
Start: 2022-04-20 | End: 2022-04-20

## 2022-04-20 RX ORDER — ONDANSETRON 4 MG/1
4 TABLET, FILM COATED ORAL ONCE AS NEEDED
Status: DISCONTINUED | OUTPATIENT
Start: 2022-04-20 | End: 2022-04-20 | Stop reason: HOSPADM

## 2022-04-20 RX ORDER — PROPOFOL 10 MG/ML
VIAL (ML) INTRAVENOUS AS NEEDED
Status: DISCONTINUED | OUTPATIENT
Start: 2022-04-20 | End: 2022-04-20 | Stop reason: SURG

## 2022-04-20 RX ORDER — ONDANSETRON 2 MG/ML
INJECTION INTRAMUSCULAR; INTRAVENOUS AS NEEDED
Status: DISCONTINUED | OUTPATIENT
Start: 2022-04-20 | End: 2022-04-20 | Stop reason: SURG

## 2022-04-20 RX ORDER — SODIUM CHLORIDE 0.9 % (FLUSH) 0.9 %
10 SYRINGE (ML) INJECTION EVERY 12 HOURS SCHEDULED
Status: DISCONTINUED | OUTPATIENT
Start: 2022-04-20 | End: 2022-04-20 | Stop reason: HOSPADM

## 2022-04-20 RX ORDER — SODIUM CHLORIDE, SODIUM LACTATE, POTASSIUM CHLORIDE, CALCIUM CHLORIDE 600; 310; 30; 20 MG/100ML; MG/100ML; MG/100ML; MG/100ML
70 INJECTION, SOLUTION INTRAVENOUS CONTINUOUS
Status: DISCONTINUED | OUTPATIENT
Start: 2022-04-20 | End: 2022-04-20 | Stop reason: HOSPADM

## 2022-04-20 RX ORDER — EPHEDRINE SULFATE 50 MG/ML
INJECTION, SOLUTION INTRAVENOUS AS NEEDED
Status: DISCONTINUED | OUTPATIENT
Start: 2022-04-20 | End: 2022-04-20 | Stop reason: SURG

## 2022-04-20 RX ORDER — ONDANSETRON 2 MG/ML
4 INJECTION INTRAMUSCULAR; INTRAVENOUS ONCE AS NEEDED
Status: DISCONTINUED | OUTPATIENT
Start: 2022-04-20 | End: 2022-04-20 | Stop reason: HOSPADM

## 2022-04-20 RX ORDER — ONDANSETRON 2 MG/ML
4 INJECTION INTRAMUSCULAR; INTRAVENOUS EVERY 6 HOURS PRN
Status: DISCONTINUED | OUTPATIENT
Start: 2022-04-20 | End: 2022-04-20 | Stop reason: HOSPADM

## 2022-04-20 RX ORDER — DEXAMETHASONE SODIUM PHOSPHATE 4 MG/ML
INJECTION, SOLUTION INTRA-ARTICULAR; INTRALESIONAL; INTRAMUSCULAR; INTRAVENOUS; SOFT TISSUE AS NEEDED
Status: DISCONTINUED | OUTPATIENT
Start: 2022-04-20 | End: 2022-04-20 | Stop reason: SURG

## 2022-04-20 RX ORDER — MIDAZOLAM HYDROCHLORIDE 1 MG/ML
2 INJECTION INTRAMUSCULAR; INTRAVENOUS ONCE
Status: COMPLETED | OUTPATIENT
Start: 2022-04-20 | End: 2022-04-20

## 2022-04-20 RX ADMIN — DEXAMETHASONE SODIUM PHOSPHATE 4 MG: 4 INJECTION, SOLUTION INTRA-ARTICULAR; INTRALESIONAL; INTRAMUSCULAR; INTRAVENOUS; SOFT TISSUE at 07:45

## 2022-04-20 RX ADMIN — OXYCODONE HYDROCHLORIDE 5 MG: 5 TABLET ORAL at 08:58

## 2022-04-20 RX ADMIN — EPHEDRINE SULFATE 10 MG: 50 INJECTION INTRAVENOUS at 08:04

## 2022-04-20 RX ADMIN — FENTANYL CITRATE 50 MCG: 50 INJECTION, SOLUTION INTRAMUSCULAR; INTRAVENOUS at 07:38

## 2022-04-20 RX ADMIN — DEXMEDETOMIDINE HYDROCHLORIDE 10 MCG: 100 INJECTION, SOLUTION, CONCENTRATE INTRAVENOUS at 08:10

## 2022-04-20 RX ADMIN — DEXMEDETOMIDINE HYDROCHLORIDE 10 MCG: 100 INJECTION, SOLUTION, CONCENTRATE INTRAVENOUS at 07:55

## 2022-04-20 RX ADMIN — SODIUM CHLORIDE, POTASSIUM CHLORIDE, SODIUM LACTATE AND CALCIUM CHLORIDE: 600; 310; 30; 20 INJECTION, SOLUTION INTRAVENOUS at 08:20

## 2022-04-20 RX ADMIN — CEFAZOLIN SODIUM 2 G: 2 INJECTION, SOLUTION INTRAVENOUS at 07:37

## 2022-04-20 RX ADMIN — HYDROMORPHONE HYDROCHLORIDE 0.5 MG: 1 INJECTION, SOLUTION INTRAMUSCULAR; INTRAVENOUS; SUBCUTANEOUS at 08:44

## 2022-04-20 RX ADMIN — SODIUM CHLORIDE, POTASSIUM CHLORIDE, SODIUM LACTATE AND CALCIUM CHLORIDE 9 ML/HR: 600; 310; 30; 20 INJECTION, SOLUTION INTRAVENOUS at 07:27

## 2022-04-20 RX ADMIN — SUGAMMADEX 200 MG: 100 INJECTION, SOLUTION INTRAVENOUS at 08:15

## 2022-04-20 RX ADMIN — ONDANSETRON 4 MG: 2 INJECTION INTRAMUSCULAR; INTRAVENOUS at 08:44

## 2022-04-20 RX ADMIN — ACETAMINOPHEN 1000 MG: 500 TABLET ORAL at 07:22

## 2022-04-20 RX ADMIN — OXYCODONE HYDROCHLORIDE 5 MG: 5 TABLET ORAL at 09:36

## 2022-04-20 RX ADMIN — KETOROLAC TROMETHAMINE 30 MG: 30 INJECTION, SOLUTION INTRAMUSCULAR; INTRAVENOUS at 08:10

## 2022-04-20 RX ADMIN — ONDANSETRON 4 MG: 2 INJECTION INTRAMUSCULAR; INTRAVENOUS at 07:45

## 2022-04-20 RX ADMIN — DEXMEDETOMIDINE HYDROCHLORIDE 10 MCG: 100 INJECTION, SOLUTION, CONCENTRATE INTRAVENOUS at 07:43

## 2022-04-20 RX ADMIN — MEPERIDINE HYDROCHLORIDE 12.5 MG: 25 INJECTION INTRAMUSCULAR; INTRAVENOUS; SUBCUTANEOUS at 08:58

## 2022-04-20 RX ADMIN — DEXMEDETOMIDINE HYDROCHLORIDE 20 MCG: 100 INJECTION, SOLUTION, CONCENTRATE INTRAVENOUS at 07:37

## 2022-04-20 RX ADMIN — ROCURONIUM BROMIDE 50 MG: 10 INJECTION INTRAVENOUS at 07:38

## 2022-04-20 RX ADMIN — PROPOFOL 200 MG: 10 INJECTION, EMULSION INTRAVENOUS at 07:38

## 2022-04-20 RX ADMIN — ONDANSETRON 4 MG: 2 INJECTION INTRAMUSCULAR; INTRAVENOUS at 07:23

## 2022-04-20 RX ADMIN — MIDAZOLAM HYDROCHLORIDE 2 MG: 1 INJECTION, SOLUTION INTRAMUSCULAR; INTRAVENOUS at 07:25

## 2022-04-20 NOTE — ANESTHESIA POSTPROCEDURE EVALUATION
Patient: Rupert Briscoe    Procedure Summary     Date: 04/20/22 Room / Location: [facility] OR 04 / [facility] MAIN OR    Anesthesia Start: 0737 Anesthesia Stop: 0826    Procedure: UMBILICAL HERNIA REPAIR LAPAROSCOPIC (N/A Abdomen) Diagnosis:       Umbilical hernia without obstruction and without gangrene      (Umbilical hernia without obstruction and without gangrene [K42.9])    Surgeons: Tad Ortiz MD Provider: Juan Dela Cruz MD    Anesthesia Type: general ASA Status: 3          Anesthesia Type: general    Vitals  Vitals Value Taken Time   /77 04/20/22 0855   Temp 36.2 °C (97.2 °F) 04/20/22 0824   Pulse 82 04/20/22 0857   Resp 21 04/20/22 0850   SpO2 97 % 04/20/22 0857   Vitals shown include unvalidated device data.        Post Anesthesia Care and Evaluation    Patient location during evaluation: bedside  Patient participation: complete - patient participated  Level of consciousness: awake  Pain management: adequate  Airway patency: patent  Anesthetic complications: No anesthetic complications  PONV Status: none  Cardiovascular status: acceptable and stable  Respiratory status: acceptable  Hydration status: acceptable    Comments: An Anesthesiologist personally participated in the most demanding procedures (including induction and emergence if applicable) in the anesthesia plan, monitored the course of anesthesia administration at frequent intervals and remained physically present and available for immediate diagnosis and treatment of emergencies.

## 2022-04-20 NOTE — OP NOTE
UMBILICAL HERNIA REPAIR LAPAROSCOPIC  Procedure Report    Patient Name:  Rupert Briscoe  YOB: 1970    Date of Surgery:  4/20/2022     Indications: The patient is a 51-year-old gentleman that presented with a symptomatic umbilical hernia.  The decision was made to proceed with a laparoscopic umbilical hernia repair.    Pre-op Diagnosis: Umbilical hernia    Post-Op Diagnosis: Same    Procedure/CPT® Codes:    Laparoscopic umbilical hernia repair    Staff:  Surgeon(s):  Tad Ortiz MD    Assistant: Sil Aguirre CSA    Anesthesia: General    Estimated Blood Loss: 10 mL    Implants:    Implant Name Type Inv. Item Serial No.  Lot No. LRB No. Used Action   MESH VENTRALIGHT ST ECHO PS CIR 6IN - [id number] Implant MESH VENTRALIGHT ST ECHO PS CIR 6IN  [employer]  (DIV [employer]) [id number] N/A 1 Implanted   SYS FIX TISS OPTIFIX/AT ABS ARTICULR/TECHNOLOGY EA/30FASTNR - [id number] Implant SYS FIX TISS OPTIFIX/AT ABS ARTICULR/TECHNOLOGY EA/30FASTNR  [employer]  (DIV [employer]) [id number] N/A 1 Implanted       Specimen:          None        Findings: None    Complications: None    Description of Procedure: The patient was taken to the operating room and placed on the table in supine position.  After induction of general anesthesia, the abdomen was prepped and draped sterilely.  The abdomen was insufflated with a Veress needle and a 12 mm left upper quadrant port was then placed into the peritoneal cavity using a Visiport.  A 5 mm left flank port was then placed under direct vision.  He was noted to have a 2 to 3 cm umbilical hernia defect with some omentum stuck up to the margin of the hernia defect.  His adhesions were taken down with the LigaSure dissector and I then also excised his hernia sac using the LigaSure.  I then placed a 15 cm circular piece of echo mesh into the peritoneal cavity and then pulled it up to the anterior abdominal wall below the hernia defect using a suture passer.  The  mesh was then tacked in place with 2 concentric rows of absorbable tacks using the OPTi fix AT tacker.  Once all of her tacks were placed we appear to have a secure fixation of the mesh to the anterior abdominal wall.  We had good hemostasis.  The left upper quadrant port site was then closed with a Lopez Bruno closure device and a 0 Mersilene tie.  The abdomen was desufflated and her other port was removed.  Our skin incisions were closed with 4-0 Vicryl subcuticular sutures.  Sterile dressings were applied and he was taken the postanesthesia recovery room in stable condition.    Assistant: Sil Aguirre CSA  was responsible for performing the following activities: Retraction, Suturing, Placing Dressing and Held/Positioned Camera and their skilled assistance was necessary for the success of this case.    Tad Ortiz MD     Date: 4/20/2022  Time: 08:18 EDT

## 2022-04-20 NOTE — ANESTHESIA PREPROCEDURE EVALUATION
Anesthesia Evaluation     Patient summary reviewed and Nursing notes reviewed   no history of anesthetic complications:  NPO Solid Status: > 8 hours  NPO Liquid Status: > 2 hours           Airway   Mallampati: II  TM distance: >3 FB  Neck ROM: full  No difficulty expected  Dental      Pulmonary - normal exam    breath sounds clear to auscultation  (+) shortness of breath, sleep apnea,   Cardiovascular - normal exam  Exercise tolerance: good (4-7 METS)    Rhythm: regular  Rate: normal    (+) hypertension, hyperlipidemia,       Neuro/Psych  (+) headaches,    GI/Hepatic/Renal/Endo    (+)   diabetes mellitus type 2,     Musculoskeletal (-) negative ROS    Abdominal    Substance History - negative use     OB/GYN negative ob/gyn ROS         Other - negative ROS                       Anesthesia Plan    ASA 3     general   (Patient understands anesthesia not responsible for dental damage.)  intravenous induction     Anesthetic plan, all risks, benefits, and alternatives have been provided, discussed and informed consent has been obtained with: patient.  Use of blood products discussed with patient .   Plan discussed with CRNA.        CODE STATUS:

## 2022-04-20 NOTE — DISCHARGE INSTRUCTIONS
DISCHARGE INSTRUCTIONS  HERNIA      For your surgery you had:  General anesthesia (you may have a sore throat for the first 24 hours)  You may experience dizziness, drowsiness, or light-headedness for several hours following surgery/procedure.  Do not stay alone today or tonight.  Limit your activity for 24 hours.  Resume your diet slowly.  Follow whatever special dietary instructions you may have been given by your doctor.  You should not drive, operate machinery, drink alcohol, or sign legally binding documents for 24 hours or while you are taking pain medication.    NOTIFY YOUR DOCTOR IF YOU EXPERIENCE ANY OF THE FOLLOWING:  Temperature greater than 101 degrees Fahrenheit  Shaking Chills  Redness or excessive drainage from incision  Nausea, vomiting and/or pain that is not controlled by prescribed medications  Increase in bleeding or bleeding that is excessive  Unable to urinate in 6 hours after surgery  If unable to reach your doctor, please go to the closest Emergency Room [x] You may remove dressing: in 48 hours  [x] You may shower: in 48 hours  Apply an ice pack for 24-48 hours.  Do not do any heavy lifting, pushing or pulling.  You may walk up and down stairs.  You may ride in a car but do not drive until instructed by your physician.  Avoid constipation.  If unable to urinate in 6 to 8 hours after surgery or urinating frequently in small amounts, notify your doctor or go to the nearest Emergency Room.  Medications per physician instructions as indicated on Discharge Medication Information Sheet.    Last dose of pain medication was given at:  Tylenol last at 7:22am,  Toradol last at 8:10am, may take ibuprofen next at 2:10pm.

## 2022-05-02 RX ORDER — MELATONIN
2000 DAILY
COMMUNITY
Start: 2022-04-15

## 2022-05-03 ENCOUNTER — TELEPHONE (OUTPATIENT)
Dept: SURGERY | Facility: CLINIC | Age: 52
End: 2022-05-03

## 2022-05-06 ENCOUNTER — OFFICE VISIT (OUTPATIENT)
Dept: SURGERY | Facility: CLINIC | Age: 52
End: 2022-05-06

## 2022-05-06 VITALS — BODY MASS INDEX: 36.13 KG/M2 | WEIGHT: 266.76 LBS | RESPIRATION RATE: 16 BRPM | HEIGHT: 72 IN

## 2022-05-06 DIAGNOSIS — K42.9 UMBILICAL HERNIA WITHOUT OBSTRUCTION AND WITHOUT GANGRENE: Primary | ICD-10-CM

## 2022-05-06 PROCEDURE — 99024 POSTOP FOLLOW-UP VISIT: CPT | Performed by: SURGERY

## 2022-05-06 NOTE — PROGRESS NOTES
Chief Complaint  Hernia    Subjective          Rupert Briscoe presents to [facility] GENERAL SURGERY  History of Present Illness    Rupert Briscoe is a 51 y.o. male  who presents today for a postoperative visit.     Patient is here for a follow-up after a laparoscopic umbilical hernia repair.  He is doing okay and had no complaints.    Past History:  Medical History: has a past medical history of Allergic, Anxiety, Colon polyp, Depression, Diabetes mellitus (HCC), Diverticulosis, Hernia of abdominal wall, Hyperlipidemia, Hypertension, Migraine, Pancreatitis, Prostatitis, Shortness of breath, Sinusitis, Umbilical hernia, and Urinary tract infection.   Surgical History: has a past surgical history that includes Mandible fracture surgery; Tonsillectomy; Cholecystectomy; Hernia repair; Hemorrhoid surgery; Colon surgery; Appendectomy; and Umbilical hernia repair (N/A, 4/20/2022).   Family History: family history is not on file. He was adopted.   Social History: reports that he has been smoking cigarettes. He has been smoking about 0.50 packs per day. He has never used smokeless tobacco. He reports previous alcohol use. He reports that he does not use drugs.  Allergies: Patient has no known allergies.       Current Outpatient Medications:   •  ACCU-CHEK SONU PLUS test strip, Check glucose daily in the morning., Disp: 3 each, Rfl: 0  •  atorvastatin (LIPITOR) 10 MG tablet, Take 10 mg by mouth Daily., Disp: , Rfl:   •  Blood Glucose Monitoring Suppl (ACCU-CHEK SONU PLUS) w/Device kit, Check glucose daily in the morning., Disp: 1 kit, Rfl: 0  •  cholecalciferol (VITAMIN D3) 25 MCG (1000 UT) tablet, Take 2,000 Units by mouth Daily., Disp: , Rfl:   •  fexofenadine (ALLEGRA) 180 MG tablet, Take 180 mg by mouth Daily., Disp: , Rfl:   •  glimepiride (AMARYL) 2 MG tablet, Take 2 mg by mouth Every Morning Before Breakfast. INSTRUCTED PER ANESTHESIA STANDING ORDERS, Disp: , Rfl:   •   "HYDROcodone-acetaminophen (Norco) 5-325 MG per tablet, Take 1 tablet by mouth Every 6 (Six) Hours As Needed for Moderate Pain ., Disp: 10 tablet, Rfl: 0  •  Lancets (ACCU-CHEK MULTICLIX) lancets, Check glucose daily in the morning., Disp: 3 each, Rfl: 0  •  lisinopril (PRINIVIL,ZESTRIL) 5 MG tablet, Take 5 mg by mouth Daily., Disp: , Rfl:   •  metFORMIN (GLUCOPHAGE) 500 MG tablet, Take 1 tablet by mouth 2 (Two) Times a Day With Meals. (Patient taking differently: Take 500 mg by mouth 2 (Two) Times a Day With Meals. INSTRUCTED PER ANESTHESIA STANDING ORDERS), Disp: 120 tablet, Rfl: 5  •  Omega-3 Fatty Acids (fish oil) 1000 MG capsule capsule, Take 2 g by mouth Daily., Disp: , Rfl:   •  OXcarbazepine (TRILEPTAL) 300 MG tablet, Take 300 mg by mouth 2 (Two) Times a Day., Disp: , Rfl:        Physical Exam  He appears well today and his incisions all look good.  Objective     Vital Signs:   Resp 16   Ht 182.9 cm (72.01\")   Wt 121 kg (266 lb 12.1 oz)   BMI 36.17 kg/m²              Assessment and Plan    Diagnoses and all orders for this visit:    1. Umbilical hernia without obstruction and without gangrene (Primary)    We will let him go back to work later next week.  I will see him back on an as-needed basis.      "

## 2022-05-13 ENCOUNTER — OFFICE VISIT (OUTPATIENT)
Dept: SLEEP MEDICINE | Facility: HOSPITAL | Age: 52
End: 2022-05-13

## 2022-05-13 VITALS
SYSTOLIC BLOOD PRESSURE: 119 MMHG | HEART RATE: 104 BPM | DIASTOLIC BLOOD PRESSURE: 81 MMHG | TEMPERATURE: 95.9 F | BODY MASS INDEX: 36.44 KG/M2 | OXYGEN SATURATION: 96 % | WEIGHT: 269 LBS | HEIGHT: 72 IN

## 2022-05-13 DIAGNOSIS — R06.83 SNORING: Primary | ICD-10-CM

## 2022-05-13 DIAGNOSIS — G47.33 OSA (OBSTRUCTIVE SLEEP APNEA): ICD-10-CM

## 2022-05-13 PROCEDURE — G0463 HOSPITAL OUTPT CLINIC VISIT: HCPCS

## 2022-05-13 NOTE — PROGRESS NOTES
[facility]      Patient Care Team:  Kelvin Prescott NP as PCP - General (Nurse Practitioner)  Tad Ortiz MD as Consulting Physician (General Surgery)    Referring Provider: Kelvin Prescott NP    Chief complaint:   Snoring and apnea    History of present illness:    Subjective   This is a 51 year old male patient with hx of HTN, DM and depression.     He reported loud snoring for years. Snoring disturbs his wife who notices him stopping breathing and gasping for breath.     He wakes up with a dry mouth hand sometimes has headache in the morning. Additional symptoms include grinding teeth and waking up with a sour taste in his mouth.     He reported a prior diagnosis of ISA>10 years ago and treated with CPAP but could not tolerate it after 1 week of usage.     Sleep is not refreshing.     Sleep schedule:  -Bedtime: 11 PM  -Sleep latency: 20-30 min  -Wake up time: 8:30 AM , does not feel refreshed  -Nocturnal awakenin times because of nocturia.  No difficulties going back to sleep.  -Perceived sleep hours: 7-8      ESS: Total score: 14     Review of Systems  Constitutional: No fever or chills. No changes in appetite.   ENMT: Nasal  Congestion. No postnasal drip, hoarsness  Cardiovascular: No chest pain, palpitation or legs swelling.    Respiratory: Dyspnea and cough. No wheezing.  Gastrointestinal: No constipation, diarrhea, abdominal pain or acid reflux  Neurology: No headache, weakness, numbness or dizziness.   Musculoskeletal: No joints pain, stiffness or swelling.   Psychiatry: Depression and anxiety.  Hem/Lymphatic: No swollen glands or easy bruising.  Integumentary: No rash.  Endocrinology: No excessive thirst, cold or warm intolerance.   Urinary: No dysuria, bloody urine or frequent urination.     History  Past Medical History:   Diagnosis Date   • Allergic    • Anxiety    • Colon polyp    • Depression    • Diabetes mellitus (HCC)     DOESN'T  CHECK BG AT HOME   • Diverticulosis    • Hernia of abdominal wall    • Hyperlipidemia    • Hypertension    • Migraine    • Pancreatitis    • Prostatitis    • Shortness of breath     DENIED ANY CURRENTLY   • Sinusitis    • Umbilical hernia    • Urinary tract infection     NO CURRENT PROBLEMS   ,   Past Surgical History:   Procedure Laterality Date   • APPENDECTOMY     • CHOLECYSTECTOMY     • COLON SURGERY      R/T DIVERTICULITIS   • HEMORRHOIDECTOMY     • HERNIA REPAIR      X2   • MANDIBLE FRACTURE SURGERY     • TONSILLECTOMY     • UMBILICAL HERNIA REPAIR N/A 4/20/2022    Procedure: UMBILICAL HERNIA REPAIR LAPAROSCOPIC;  Surgeon: Tad Ortiz MD;  Location: [facility] MAIN OR;  Service: General;  Laterality: N/A;   ,   Family History   Adopted: Yes   ,   Social History     Socioeconomic History   • Marital status:    Tobacco Use   • Smoking status: Current Every Day Smoker     Packs/day: 0.50     Types: Cigarettes   • Smokeless tobacco: Never Used   • Tobacco comment: 'quit last year' - smoked 30+ years - INSTRUCTED NO SMOKING 24 HR PRIOR TO SURGERY   Vaping Use   • Vaping Use: Never used   Substance and Sexual Activity   • Alcohol use: Not Currently     Comment: soc    • Drug use: No   • Sexual activity: Yes     Partners: Female     Birth control/protection: None     E-cigarette/Vaping   • E-cigarette/Vaping Use Never User      E-cigarette/Vaping Substances   • Nicotine No    • THC No    • CBD No    • Flavoring No      E-cigarette/Vaping Devices   • Disposable No    • Pre-filled or Refillable Cartridge No    • Refillable Tank No    • Pre-filled Pod No         and Allergies:  Patient has no known allergies.    Medications:    Current Outpatient Medications:   •  ACCU-CHEK SONU PLUS test strip, Check glucose daily in the morning., Disp: 3 each, Rfl: 0  •  atorvastatin (LIPITOR) 10 MG tablet, Take 10 mg by mouth Daily., Disp: , Rfl:   •  Blood Glucose Monitoring Suppl (ACCU-CHEK SONU PLUS) w/Device kit,  "Check glucose daily in the morning., Disp: 1 kit, Rfl: 0  •  cholecalciferol (VITAMIN D3) 25 MCG (1000 UT) tablet, Take 2,000 Units by mouth Daily., Disp: , Rfl:   •  fexofenadine (ALLEGRA) 180 MG tablet, Take 180 mg by mouth Daily., Disp: , Rfl:   •  glimepiride (AMARYL) 2 MG tablet, Take 2 mg by mouth Every Morning Before Breakfast. INSTRUCTED PER ANESTHESIA STANDING ORDERS, Disp: , Rfl:   •  HYDROcodone-acetaminophen (Norco) 5-325 MG per tablet, Take 1 tablet by mouth Every 6 (Six) Hours As Needed for Moderate Pain ., Disp: 10 tablet, Rfl: 0  •  Lancets (ACCU-CHEK MULTICLIX) lancets, Check glucose daily in the morning., Disp: 3 each, Rfl: 0  •  lisinopril (PRINIVIL,ZESTRIL) 5 MG tablet, Take 5 mg by mouth Daily., Disp: , Rfl:   •  metFORMIN (GLUCOPHAGE) 500 MG tablet, Take 1 tablet by mouth 2 (Two) Times a Day With Meals. (Patient taking differently: Take 500 mg by mouth 2 (Two) Times a Day With Meals. INSTRUCTED PER ANESTHESIA STANDING ORDERS), Disp: 120 tablet, Rfl: 5  •  Omega-3 Fatty Acids (fish oil) 1000 MG capsule capsule, Take 2 g by mouth Daily., Disp: , Rfl:   •  OXcarbazepine (TRILEPTAL) 300 MG tablet, Take 300 mg by mouth 2 (Two) Times a Day., Disp: , Rfl:       Objective   Vital Signs:  Vitals:    05/13/22 1300   BP: 119/81   Pulse: 104   Temp: 95.9 °F (35.5 °C)   SpO2: 96%   Weight: 122 kg (269 lb)   Height: 182.9 cm (72\")     Body mass index is 36.48 kg/m².        Physical Exam:        Constitutional: Not in acute distress.  Eyes: Injected conjunctiva, EOMI. pupils equal reactive to light.  ENMT: Gomez score 3. Mallampati score 3. No oral thrush. Tonsils grade 1. Narrow distance in between the posterior pharyngeal pillars.  Neck: Large. No thyromegaly.  Trachea midline.  Heart: Regular rhythm and rate, no murmur  Lungs: Good and equal air entry bilaterally. No crackles or wheezing.  Nonlabored breathing.       Abdomen: Obese.  Soft.  No tenderness.  Positive bowel sounds.  Extremities: No " cyanosis, clubbing or pitting edema.  Warm extremities and well-perfused.  Neuro: Conscious, alert, oriented x3.  Gait is normal.  Strength 5/5 in arms and legs.  Psych: Appropriate mood and affect.    Integumentary: No rash.  Normal skin turgor.  Lymphatic: No palpable cervical or supraclavicular lymph nodes.    Diagnostic data:  Lab Results   Component Value Date    HGBA1C 7.8 (H) 03/02/2022     Triglycerides   Date Value Ref Range Status   09/29/2020 449 (H) <150 mg/dL Final     Comment:     Triglycerides Reference Ranges:  Borderline High: 150-199  High: 200-499  Very High: >500     HDL Cholesterol   Date Value Ref Range Status   09/29/2020 20 (L) >40 mg/dL Final     Hemoglobin   Date Value Ref Range Status   04/12/2022 13.7 13.5 - 17.5 g/dL Final     CO2   Date Value Ref Range Status   04/20/2022 26.3 22.0 - 29.0 mmol/L Final     Total CO2   Date Value Ref Range Status   09/29/2020 27 22 - 30 mmol/L Final       Assessment   1. ISA  2. Obesity, BMI 36  3. Hypersomnia, unspecified.  4. HTN      Plan:  Check Split PSG. We discussed the pathophysiology of sleep apnea, testing and therapy which include CPAP and weight loss.  Patient is agreeable with CPAP therapy if needed.     Counseled for weight loss.  Encouraged to exercise regularly and cut down on carbohydrates.  Discussed that losing weight may decrease the severity of sleep apnea and obviate the need of CPAP therapy.    Counseled against driving or operating of machinery when sleepy.    Discussed the association between obstructive sleep apnea and cardiovascular disease including HTN and the beneficial effect of Pap therapy in reducing the risk of major cardiovascular events.          Jahaira Toro MD  05/13/22  13:16 EDT    This note was dictated utilizing Dragon dictation

## 2022-08-16 ENCOUNTER — HOSPITAL ENCOUNTER (OUTPATIENT)
Dept: SLEEP MEDICINE | Facility: HOSPITAL | Age: 52
Discharge: HOME | End: 2022-08-16
Admitting: INTERNAL MEDICINE

## 2022-08-16 DIAGNOSIS — G47.33 OSA (OBSTRUCTIVE SLEEP APNEA): ICD-10-CM

## 2022-08-16 PROCEDURE — 95810 POLYSOM 6/> YRS 4/> PARAM: CPT

## 2022-08-19 DIAGNOSIS — G47.33 OSA (OBSTRUCTIVE SLEEP APNEA): Primary | ICD-10-CM

## 2023-04-03 ENCOUNTER — TELEPHONE (OUTPATIENT)
Dept: UROLOGY | Facility: CLINIC | Age: 53
End: 2023-04-03
Payer: COMMERCIAL

## 2023-04-03 DIAGNOSIS — N50.812 TESTICULAR PAIN, LEFT: Primary | ICD-10-CM

## 2023-04-03 NOTE — TELEPHONE ENCOUNTER
LMOM for pt to call back regarding him needing an US of test/scrotum before his appointment with [facility] on 4/19/23.

## 2023-04-12 ENCOUNTER — HOSPITAL ENCOUNTER (OUTPATIENT)
Dept: ULTRASOUND IMAGING | Facility: HOSPITAL | Age: 53
Discharge: HOME | End: 2023-04-12
Admitting: NURSE PRACTITIONER
Payer: COMMERCIAL

## 2023-04-12 DIAGNOSIS — N50.812 TESTICULAR PAIN, LEFT: ICD-10-CM

## 2023-04-12 PROCEDURE — 76870 US EXAM SCROTUM: CPT

## 2023-04-18 ENCOUNTER — TELEPHONE (OUTPATIENT)
Dept: UROLOGY | Facility: CLINIC | Age: 53
End: 2023-04-18
Payer: COMMERCIAL

## 2023-05-24 ENCOUNTER — OFFICE VISIT (OUTPATIENT)
Dept: UROLOGY | Facility: CLINIC | Age: 53
End: 2023-05-24
Payer: COMMERCIAL

## 2023-05-24 VITALS — RESPIRATION RATE: 15 BRPM | WEIGHT: 274 LBS | BODY MASS INDEX: 37.11 KG/M2 | HEIGHT: 72 IN

## 2023-05-24 DIAGNOSIS — N50.812 TESTICULAR PAIN, LEFT: Primary | ICD-10-CM

## 2023-05-24 DIAGNOSIS — N32.89 BLADDER SPASMS: ICD-10-CM

## 2023-05-24 PROBLEM — I20.0 PROGRESSIVE ANGINA: Status: ACTIVE | Noted: 2022-04-01

## 2023-05-24 PROBLEM — E78.00 HIGH CHOLESTEROL: Status: ACTIVE | Noted: 2019-05-21

## 2023-05-24 PROBLEM — G43.909 MIGRAINE: Status: ACTIVE | Noted: 2023-05-06

## 2023-05-24 PROBLEM — F39 MOOD DISORDER: Status: ACTIVE | Noted: 2023-05-24

## 2023-05-24 PROBLEM — K21.9 GASTRO-ESOPHAGEAL REFLUX DISEASE WITHOUT ESOPHAGITIS: Status: ACTIVE | Noted: 2022-03-05

## 2023-05-24 PROBLEM — N42.9 PROSTATE DISORDER: Status: ACTIVE | Noted: 2023-05-24

## 2023-05-24 PROBLEM — K62.5 RECTAL BLEEDING: Status: ACTIVE | Noted: 2023-05-24

## 2023-05-24 PROBLEM — E11.9 DIABETES MELLITUS: Status: ACTIVE | Noted: 2019-05-21

## 2023-05-24 PROBLEM — F33.1 MAJOR DEPRESSIVE DISORDER, RECURRENT, MODERATE: Status: ACTIVE | Noted: 2022-03-05

## 2023-05-24 PROBLEM — E10.65 TYPE 1 DIABETES MELLITUS WITH HYPERGLYCEMIA: Status: ACTIVE | Noted: 2022-03-05

## 2023-05-24 PROBLEM — J30.9 ALLERGIC RHINITIS: Status: ACTIVE | Noted: 2023-05-24

## 2023-05-24 RX ORDER — SEMAGLUTIDE 0.68 MG/ML
INJECTION, SOLUTION SUBCUTANEOUS
COMMUNITY
Start: 2023-05-16

## 2023-05-24 RX ORDER — LISINOPRIL 5 MG/1
TABLET ORAL
COMMUNITY
Start: 2023-03-08

## 2023-05-24 RX ORDER — ATORVASTATIN CALCIUM 20 MG/1
TABLET, FILM COATED ORAL
COMMUNITY
Start: 2023-05-15

## 2023-05-24 RX ORDER — ERGOCALCIFEROL 1.25 MG/1
CAPSULE ORAL
COMMUNITY
Start: 2023-05-15

## 2023-05-24 RX ORDER — PSEUDOEPHEDRINE HCL 30 MG
100 TABLET ORAL DAILY
COMMUNITY

## 2023-05-24 RX ORDER — BUSPIRONE HYDROCHLORIDE 10 MG/1
TABLET ORAL
COMMUNITY
Start: 2023-05-06

## 2023-05-24 RX ORDER — OMEPRAZOLE 40 MG/1
CAPSULE, DELAYED RELEASE ORAL
COMMUNITY
Start: 2023-03-08

## 2023-05-24 NOTE — PROGRESS NOTES
"Chief Complaint: Testicle Pain    Subjective         History of Present Illness  Rupert Briscoe is a 52 y.o. male presents to [facility] to be seen for testicular pain.    He has a hx of undescended testicle on the right with repair in his early teens.     He saw his PCP in 1/2023 with c/o testicular pain.     He had an us which revealed Left varicocele noted. Somewhat unusual appearing right testicle. This is of uncertain   etiology. Six-week follow-up ultrasound recommended to ensure resolution.     We repeated u/s in 4/2023 which revealed  Smaller right than left testicle with history of surgery on the right side.  A small   right-sided varicocele.  Small left-sided hydrocele.    He states he has pain every 2-3 weeks and lasting only a day states that he feels like his \"testicles are in a vice\".      He does state excess caffeine consumption drinking energy drinks and sugar free sodas.     He reports if he holds his urine and tried to void then will walk away feeling like he Is done and will leak.    He denies weak stream.     Denies hesitancy or straining to void.    Last PSA in 12/2021 0.87.    unknown family hx of gu malignancies.        Objective     Past Medical History:   Diagnosis Date   • Allergic    • Anxiety    • Colon polyp    • Depression    • Diabetes mellitus     DOESN'T CHECK BG AT HOME   • Diverticulosis    • Hernia of abdominal wall    • Hyperlipidemia    • Hypertension    • Migraine    • Pancreatitis    • Prostatitis    • Shortness of breath     DENIED ANY CURRENTLY   • Sinusitis    • Umbilical hernia    • Urinary tract infection     NO CURRENT PROBLEMS       Past Surgical History:   Procedure Laterality Date   • APPENDECTOMY     • CHOLECYSTECTOMY     • COLON SURGERY      R/T DIVERTICULITIS   • HEMORRHOIDECTOMY     • HERNIA REPAIR      X2   • MANDIBLE FRACTURE SURGERY     • TONSILLECTOMY     • UMBILICAL HERNIA REPAIR N/A 4/20/2022    Procedure: UMBILICAL HERNIA " REPAIR LAPAROSCOPIC;  Surgeon: Tad Ortiz MD;  Location: [facility] MA[facility] OR;  Service: General;  Laterality: N/A;         Current Outpatient Medications:   •  ACCU-CHEK SONU PLUS test strip, Check glucose daily in the morning., Disp: 3 each, Rfl: 0  •  atorvastatin (LIPITOR) 20 MG tablet, , Disp: , Rfl:   •  Blood Glucose Monitoring Suppl (ACCU-CHEK SONU PLUS) w/Device kit, Check glucose daily in the morning., Disp: 1 kit, Rfl: 0  •  busPIRone (BUSPAR) 10 MG tablet, , Disp: , Rfl:   •  cholecalciferol (VITAMIN D3) 25 MCG (1000 UT) tablet, Take 2 tablets by mouth Daily., Disp: , Rfl:   •  docusate sodium 100 MG capsule, Take 1 capsule by mouth Daily., Disp: , Rfl:   •  fexofenadine (ALLEGRA) 180 MG tablet, Take 1 tablet by mouth Daily., Disp: , Rfl:   •  glimepiride (AMARYL) 2 MG tablet, Take 1 tablet by mouth Every Morning Before Breakfast. INSTRUCTED PER ANESTHESIA STANDING ORDERS, Disp: , Rfl:   •  HYDROcodone-acetaminophen (Norco) 5-325 MG per tablet, Take 1 tablet by mouth Every 6 (Six) Hours As Needed for Moderate Pain ., Disp: 10 tablet, Rfl: 0  •  Lancets (ACCU-CHEK MULTICLIX) lancets, Check glucose daily in the morning., Disp: 3 each, Rfl: 0  •  lisinopril (PRINIVIL,ZESTRIL) 5 MG tablet, , Disp: , Rfl:   •  metFORMIN (GLUCOPHAGE) 500 MG tablet, Take 1 tablet by mouth 2 (Two) Times a Day With Meals. (Patient taking differently: Take 1 tablet by mouth 2 (Two) Times a Day With Meals. INSTRUCTED PER ANESTHESIA STANDING ORDERS), Disp: 120 tablet, Rfl: 5  •  metFORMIN (GLUCOPHAGE) 500 MG tablet, Take 2 tablets by mouth., Disp: , Rfl:   •  Omega-3 Fatty Acids (fish oil) 1000 MG capsule capsule, Take 2 capsules by mouth Daily., Disp: , Rfl:   •  omeprazole (priLOSEC) 40 MG capsule, , Disp: , Rfl:   •  OXcarbazepine (TRILEPTAL) 300 MG tablet, Take 1 tablet by mouth 2 (Two) Times a Day., Disp: , Rfl:   •  Ozempic, 0.25 or 0.5 MG/DOSE, 2 MG/3ML solution pen-injector, , Disp: , Rfl:   •  vitamin D (ERGOCALCIFEROL)  "1.25 MG (20557 UT) capsule capsule, , Disp: , Rfl:     No Known Allergies     Family History   Adopted: Yes       Social History     Socioeconomic History   • Marital status:    Tobacco Use   • Smoking status: Every Day     Packs/day: 0.25     Types: Cigarettes   • Smokeless tobacco: Never   • Tobacco comments:     'quit last year' - smoked 30+ years - INSTRUCTED NO SMOKING 24 HR PRIOR TO SURGERY   Vaping Use   • Vaping Use: Every day   • Substances: Nicotine   • Devices: Disposable   Substance and Sexual Activity   • Alcohol use: Not Currently     Comment: soc    • Drug use: No   • Sexual activity: Yes     Partners: Female     Birth control/protection: None       Vital Signs:   Resp 15   Ht 182.9 cm (72\")   Wt 124 kg (274 lb)   BMI 37.16 kg/m²      Physical Exam     Result Review :   The following data was reviewed by: PAOLO Leblanc on 05/24/2023:  Results for orders placed or performed during the hospital encounter of 04/20/22   Basic Metabolic Panel    Specimen: Arm, Right; Blood   Result Value Ref Range    Glucose 165 (H) 65 - 99 mg/dL    BUN 14 6 - 20 mg/dL    Creatinine 1.11 0.76 - 1.27 mg/dL    Sodium 140 136 - 145 mmol/L    Potassium 4.6 3.5 - 5.2 mmol/L    Chloride 104 98 - 107 mmol/L    CO2 26.3 22.0 - 29.0 mmol/L    Calcium 9.6 8.6 - 10.5 mg/dL    BUN/Creatinine Ratio 12.6 7.0 - 25.0    Anion Gap 9.7 5.0 - 15.0 mmol/L    eGFR 79.9 >60.0 mL/min/1.73   POC Glucose Once    Specimen: Blood   Result Value Ref Range    Glucose 218 (H) 70 - 99 mg/dL   ECG 12 Lead   Result Value Ref Range    QT Interval 387 ms       CT SCAN OF THE ABDOMEN AND PELVIS WITHOUT CONTRAST 12/9/2022 6:12 AM      HISTORY: Epigastric pain    COMPARISON: None.    PROCEDURE: Axial images were obtained from the lung bases to the pubic  symphysis by computed tomography. This study was performed with  techniques to keep radiation doses as low as reasonably achievable,  (ALARA). Individualized dose reduction techniques using " automated  exposure control or adjustment of mA and/or kV according to the patient  size were employed.    FINDINGS:     ABDOMEN: There is a focal groundglass opacity in the lingula which is  likely postinflammatory. The lung bases are otherwise clear. Post  cholecystectomy. There is a 3 mm nonobstructing stone in the right  kidney. There is a 4 mm nonobstructing stone in the left renal pelvis  which could result in intermittent obstruction. There is no  hydronephrosis. There is no free fluid or adenopathy. The solid organs  are otherwise unremarkable.     PELVIS: The appendix is normal. Urinary bladder is decompressed. There  calcifications in a normal-sized prostate. There is no free fluid or  adenopathy.    IMPRESSION:   Bilateral nephrolithiasis without obstruction. The left  renal stone could result in intermittent obstruction.        Images reviewed, interpreted, and dictated by GERARD Alvarenga MD        TESTICULAR ULTRASOUND     HISTORY: Left testicular pain. Evaluate for torsion.     PROCEDURE: Ultrasound images of the testicles were obtained bilaterally.   Color Doppler images were obtained.     FINDINGS: The right testicle is diminutive and somewhat lobulated. The   left testicle is normal in echogenicity. A prominent rete testis is   noted. A left varicocele is present. Arterial flow is identified   bilaterally. No intratesticular masses are identified. There are small   bilateral hydroceles.   Exam End: 01/31/23 13:24    Specimen Collected: 01/31/23 13:51 Last Resulted: 01/31/23 14:08   Received From: [facility]          PROCEDURE:  US SCROTUM AND TESTICLES     COMPARISON: None     INDICATIONS:  left testicular pain     TECHNIQUE:    The scrotum and testicles were evaluated with gray scale and color duplex Doppler   sonography.       FINDINGS:          TESTES:           The right testicle is 3 x 1.3 x 2.1 cm.  Patient had a history of surgery to the right   testicle.  Color blood  flow to the right testicle is present but perhaps slightly diminished   relative to the left side.  The left testicle measures approximately 4.1 x 2 x 2.9 cm.  There is   normal color blood flow to the left testicle.  Echogenicity of both testicles appear normal.    EPIDIDYMIS:     Normal.  No visible mass or cyst.    OTHER:             There is a small varicocele on the right.  There is a small hydrocele on the left.     IMPRESSION:               Smaller right than left testicle with history of surgery on the right side.  A small   right-sided varicocele.  Small left-sided hydrocele.            LIBBY MOSER MD         Electronically Signed and Approved By: LIBBY MOSER MD on 4/12/2023 at 14:06         Procedures        Assessment and Plan    Diagnoses and all orders for this visit:    1. Testicular pain, left (Primary)    2. Bladder spasms      Discussed with patient his ultrasound findings of smaller right testicle are consistent with undescended testicle.    Scrotal ultrasound reviewed; physical exam with left varicocele  I discussed with the patient the etiologies of scrotal pain and that varicocele may or may not be attributing to his discomfort,    The patient has right varicocele on U/S that is clinically insignificant at this point. Nonpalpable varicolceles have not been shown to cause infertility.  Patient has a prior CT scan which reveals no upper urinary tract abnormalities.     varicocele-discussed at length that varicocele is a group of dilated veins in the pampiniform plexus that may be associated with inguinal, scrotal, or testicular pain. Discussed that varicocele is the most common finding in sub-fertile men however not all varicoceles cause infertility. Varicocelectomy may be performed to treat associated pain.     Wishes to proceed conservatively at this time and will schedule follow appointment if needed    Discussed with patient his complaints of urinary incontinence sound to me as if he is  experiencing bladder spasms secondary to his excess caffeine consumption.    Did recommend the patient cut down on caffeine and work on timed and double voiding.    Also recommended good scrotal support to help manage pain of varicocele or hydrocele.        I spent 15  minutes caring for Rupert on this date of service. This time includes time spent by me in the following activities:reviewing tests, obtaining and/or reviewing a separately obtained history, performing a medically appropriate examination and/or evaluation , counseling and educating the patient/family/caregiver, ordering medications, tests, or procedures, and documenting information in the medical record  Follow Up   Return in about 3 months (around 8/24/2023) for f/u bladder spasms.  Patient was given instructions and counseling regarding his condition or for health maintenance advice. Please see specific information pulled into the AVS if appropriate.         This document has been electronically signed by PAOLO Leblanc  May 24, 2023 15:41 EDT

## 2023-08-30 ENCOUNTER — TELEPHONE (OUTPATIENT)
Dept: UROLOGY | Facility: CLINIC | Age: 53
End: 2023-08-30

## 2023-08-30 NOTE — TELEPHONE ENCOUNTER
PT NO SHOWED HIS APPT TODAY WITH [facility] IN [facility], CALLED PT TO [facility], NO ANSWER, LMOM.

## 2024-03-01 ENCOUNTER — TRANSCRIBE ORDERS (OUTPATIENT)
Dept: DIABETES SERVICES | Facility: HOSPITAL | Age: 54
End: 2024-03-01

## 2024-03-01 DIAGNOSIS — E11.65 TYPE 2 DIABETES MELLITUS WITH HYPERGLYCEMIA, WITHOUT LONG-TERM CURRENT USE OF INSULIN: Primary | ICD-10-CM

## 2024-03-21 ENCOUNTER — TELEPHONE (OUTPATIENT)
Dept: DIABETES SERVICES | Facility: HOSPITAL | Age: 54
End: 2024-03-21

## 2024-03-21 ENCOUNTER — OFFICE VISIT (OUTPATIENT)
Dept: DIABETES SERVICES | Facility: HOSPITAL | Age: 54
End: 2024-03-21
Payer: COMMERCIAL

## 2024-03-21 VITALS
WEIGHT: 261.4 LBS | DIASTOLIC BLOOD PRESSURE: 73 MMHG | BODY MASS INDEX: 35.41 KG/M2 | OXYGEN SATURATION: 97 % | HEART RATE: 90 BPM | SYSTOLIC BLOOD PRESSURE: 116 MMHG | TEMPERATURE: 98.4 F | HEIGHT: 72 IN

## 2024-03-21 DIAGNOSIS — E11.65 UNCONTROLLED TYPE 2 DIABETES MELLITUS WITH HYPERGLYCEMIA: Primary | ICD-10-CM

## 2024-03-21 DIAGNOSIS — E11.40 TYPE 2 DIABETES MELLITUS WITH DIABETIC NEUROPATHY, WITHOUT LONG-TERM CURRENT USE OF INSULIN: ICD-10-CM

## 2024-03-21 DIAGNOSIS — E11.65 TYPE 2 DIABETES MELLITUS WITH HYPERGLYCEMIA, WITHOUT LONG-TERM CURRENT USE OF INSULIN: ICD-10-CM

## 2024-03-21 PROBLEM — E10.65 TYPE 1 DIABETES MELLITUS WITH HYPERGLYCEMIA: Status: RESOLVED | Noted: 2022-03-05 | Resolved: 2024-03-21

## 2024-03-21 LAB
EXPIRATION DATE: ABNORMAL
GLUCOSE BLDC GLUCOMTR-MCNC: 194 MG/DL (ref 70–99)
HBA1C MFR BLD: 8.6 % (ref 4.5–5.7)
Lab: ABNORMAL

## 2024-03-21 PROCEDURE — G0463 HOSPITAL OUTPT CLINIC VISIT: HCPCS | Performed by: NURSE PRACTITIONER

## 2024-03-21 PROCEDURE — 3078F DIAST BP <80 MM HG: CPT | Performed by: NURSE PRACTITIONER

## 2024-03-21 PROCEDURE — 1159F MED LIST DOCD IN RCRD: CPT | Performed by: NURSE PRACTITIONER

## 2024-03-21 PROCEDURE — 82948 REAGENT STRIP/BLOOD GLUCOSE: CPT | Performed by: NURSE PRACTITIONER

## 2024-03-21 PROCEDURE — 99204 OFFICE O/P NEW MOD 45 MIN: CPT | Performed by: NURSE PRACTITIONER

## 2024-03-21 PROCEDURE — 3074F SYST BP LT 130 MM HG: CPT | Performed by: NURSE PRACTITIONER

## 2024-03-21 PROCEDURE — 3052F HG A1C>EQUAL 8.0%<EQUAL 9.0%: CPT | Performed by: NURSE PRACTITIONER

## 2024-03-21 PROCEDURE — 1160F RVW MEDS BY RX/DR IN RCRD: CPT | Performed by: NURSE PRACTITIONER

## 2024-03-21 RX ORDER — BLOOD SUGAR DIAGNOSTIC
STRIP MISCELLANEOUS
Qty: 100 EACH | Refills: 5 | Status: SHIPPED | OUTPATIENT
Start: 2024-03-21 | End: 2024-03-21

## 2024-03-21 RX ORDER — LANCETS 30 GAUGE
EACH MISCELLANEOUS
Qty: 100 EACH | Refills: 5 | Status: SHIPPED | OUTPATIENT
Start: 2024-03-21 | End: 2024-03-25 | Stop reason: SDUPTHER

## 2024-03-21 RX ORDER — LEVOCETIRIZINE DIHYDROCHLORIDE 5 MG/1
5 TABLET, FILM COATED ORAL DAILY
COMMUNITY
Start: 2023-11-01

## 2024-03-21 RX ORDER — LANCETS
EACH MISCELLANEOUS
Qty: 100 EACH | Refills: 5 | Status: SHIPPED | OUTPATIENT
Start: 2024-03-21

## 2024-03-21 RX ORDER — LANCETS 30 GAUGE
EACH MISCELLANEOUS
Qty: 100 EACH | Refills: 5 | Status: SHIPPED | OUTPATIENT
Start: 2024-03-21

## 2024-03-21 RX ORDER — BLOOD SUGAR DIAGNOSTIC
STRIP MISCELLANEOUS
Qty: 100 EACH | Refills: 5 | Status: SHIPPED | OUTPATIENT
Start: 2024-03-21

## 2024-03-21 RX ORDER — DAPAGLIFLOZIN 10 MG/1
10 TABLET, FILM COATED ORAL DAILY
COMMUNITY
Start: 2024-03-08

## 2024-03-21 RX ORDER — CARIPRAZINE 3 MG/1
3 CAPSULE, GELATIN COATED ORAL DAILY
COMMUNITY
Start: 2024-01-29

## 2024-03-21 RX ORDER — BLOOD-GLUCOSE SENSOR
1 EACH MISCELLANEOUS
Qty: 2 EACH | Refills: 5 | Status: SHIPPED | OUTPATIENT
Start: 2024-03-21

## 2024-03-21 RX ORDER — LANCETS
EACH MISCELLANEOUS
Qty: 100 EACH | Refills: 5 | Status: SHIPPED | OUTPATIENT
Start: 2024-03-21 | End: 2024-03-21

## 2024-03-21 NOTE — PATIENT INSTRUCTIONS
A Buzzoo 3 was placed on the left posterior arm and the angela was downloaded in the office.  A booklet was given on the [employer] and a prescription was sent to your pharmacy

## 2024-03-21 NOTE — PROGRESS NOTES
Chief Complaint  Type 1 Diabetes with Hyperglycemia (Pt does not know what meds he is taking and what he isn't.)    Referred By: PAOLO Mae presents to [facility] for diabetes medication management    History of Present Illness    Visit type:  to establish care  Diabetes type:  Type 2  Age at time of dx/Year of dx/Number of years: Reports he was diagnosed with type 2 diabetes 10 to 15 years ago  Family History of Diabetes: Unknown as he was adopted  Current diabetes status/concerns/issues: Reports he was referred to our clinic by his primary care provider for management of his diabetes.  He is currently taking metformin 500 mg twice a day and Amaryl 4 mg once daily and farxiga 10mg once daily. States he just started taking his medication on a regular routine. States 3 wks ago he went to ER for elevated blood sugar. He is not currently checking his blood sugar. He has tried and failed Ozempic and Mounjaro due to GI intolerability.  Other current health concerns: her has hx of ISA. He does not currently wear a CPAP. States he has worn a CPAP in the past. He is scheduled with the sleep center on 4/29/24 to get back on his CPAP. States he has a deviated septum and has difficulty wearing the mask. States he does not feel he is getting the proper air intake with the mask.   Current Diabetes symptoms:    Polyuria: Yes     Polydipsia: Yes     Polyphagia: Yes     Blurred vision: Yes     Excessive fatigue: Yes    Known Diabetes complications:  Neuropathy: Numbness and Tingling; Location: Feet  Renal: None  Eyes: No current eye exam available in record; Location: N/A; Last Eye Exam:  over a year ago ; Location:   Amputation/Wounds: None  GI: Constipation, Diarrhea, Reflux, and Indigestion  Cardiovascular: Hypertension and Hyperlipidemia  ED: Patient Reported  Other: None  Hospitalizations/ED/911 secondary to DM?  Yes, went to ER 3  wks ago due to h/a. States his blood sugar was over 300mg/dl. States his vessels were constricted due to his diabetes  Hypoglycemia:  None reported at this time  Hypoglycemia Symptoms:  No hypoglycemia at this time  Current Diabetes treatment:  metformin 500mg bid, amaryl 2mg qd, farxiga 10mg qd  Prior diabetes treatments:  ozempic, mounjaro  Using ACEI or ARB: Yes, lisinopril 5mg qd, Managed by other provider  Using Statin: Yes, lipitor 20mg qd, Managed by other provider  Blood glucose device:  Not currently monitoring BG  Blood glucose monitoring frequency:  Not currently monitoring BG  Blood glucose range/average:  Not currently monitoring BG  Glucose Source: Device Reviewed  Dietary behavior:  Limits high carb/sweet foods, Avoids sugary drinks, Number of meals each day - 3; Number of snacks each day - 2 He was eating and drinking whatever he wanted until 3 wks ago when he went to ER for high blood sugar.   Activity/Exercise:  None  Last Foot Exam:  per PCP 6 months ago  Diabetes Education Hx: Comprehensive Diabetes Education and Diabetes Nutrition Counseling  Social Determinants of Health: None    Past Medical History:   Diagnosis Date    Allergic     Anxiety     Colon polyp     Depression     Diabetes mellitus     DOESN'T CHECK BG AT HOME    Diverticulosis     Hernia of abdominal wall     Hyperlipidemia     Hypertension     Migraine     Pancreatitis     Prostatitis     Shortness of breath     DENIED ANY CURRENTLY    Sinusitis     Umbilical hernia     Urinary tract infection     NO CURRENT PROBLEMS     Past Surgical History:   Procedure Laterality Date    APPENDECTOMY      CHOLECYSTECTOMY      COLON SURGERY      R/T DIVERTICULITIS    HEMORRHOIDECTOMY      HERNIA REPAIR      X2    MANDIBLE FRACTURE SURGERY      TONSILLECTOMY      UMBILICAL HERNIA REPAIR N/A 4/20/2022    Procedure: UMBILICAL HERNIA REPAIR LAPAROSCOPIC;  Surgeon: Tad Ortiz MD;  Location: [facility] MAIN OR;  Service: General;  Laterality: N/A;      Family History   Adopted: Yes   Family history unknown: Yes     Social History     Socioeconomic History    Marital status:    Tobacco Use    Smoking status: Every Day     Current packs/day: 0.25     Types: Cigarettes    Smokeless tobacco: Never    Tobacco comments:     'quit last year' - smoked 30+ years - INSTRUCTED NO SMOKING 24 HR PRIOR TO SURGERY   Vaping Use    Vaping status: Every Day    Substances: Nicotine    Devices: Disposable   Substance and Sexual Activity    Alcohol use: Not Currently     Comment: soc     Drug use: No    Sexual activity: Yes     Partners: Female     Birth control/protection: None     No Known Allergies    Current Outpatient Medications:     Accu-Chek Crystal Plus test strip, Check glucose daily in the morning., Disp: 100 each, Rfl: 5    atorvastatin (LIPITOR) 20 MG tablet, Take 1 tablet by mouth Daily., Disp: , Rfl:     Blood Glucose Monitoring Suppl (ACCU-CHEK CRYSTAL PLUS) w/Device kit, Check glucose daily in the morning., Disp: 1 kit, Rfl: 0    busPIRone (BUSPAR) 10 MG tablet, Take 1 tablet by mouth 3 (Three) Times a Day., Disp: , Rfl:     cholecalciferol (VITAMIN D3) 25 MCG (1000 UT) tablet, Take 2 tablets by mouth Daily., Disp: , Rfl:     dapagliflozin Propanediol 10 MG tablet, Take 10 mg by mouth Daily., Disp: , Rfl:     glimepiride (AMARYL) 2 MG tablet, Take 2 tablets by mouth Every Morning Before Breakfast., Disp: , Rfl:     Lancets (accu-chek multiclix) lancets, Check glucose daily in the morning., Disp: 100 each, Rfl: 5    levocetirizine (XYZAL) 5 MG tablet, Take 1 tablet by mouth Daily., Disp: , Rfl:     lisinopril (PRINIVIL,ZESTRIL) 5 MG tablet, Take 1 tablet by mouth Daily., Disp: , Rfl:     metFORMIN (GLUCOPHAGE) 500 MG tablet, Take 1 tablet by mouth 2 (Two) Times a Day With Meals. (Patient taking differently: Take 1 tablet by mouth 2 (Two) Times a Day With Meals. INSTRUCTED PER ANESTHESIA STANDING ORDERS), Disp: 120 tablet, Rfl: 5    omeprazole (priLOSEC) 40 MG  "capsule, Take 1 capsule by mouth Daily., Disp: , Rfl:     OXcarbazepine (TRILEPTAL) 300 MG tablet, Take 1 tablet by mouth 2 (Two) Times a Day., Disp: , Rfl:     vitamin D (ERGOCALCIFEROL) 1.25 MG (40354 UT) capsule capsule, , Disp: , Rfl:     Vraylar 3 MG capsule capsule, Take 1 capsule by mouth Daily., Disp: , Rfl:     Continuous Blood Gluc Sensor (FreeStyle Jonathan 3 Sensor) misc, Use 1 each Every 14 (Fourteen) Days., Disp: 2 each, Rfl: 5    Objective     Vitals:    03/21/24 0853   BP: 116/73   BP Location: Left arm   Patient Position: Sitting   Cuff Size: Adult   Pulse: 90   Temp: 98.4 °F (36.9 °C)   TempSrc: Temporal   SpO2: 97%   Weight: 119 kg (261 lb 6.4 oz)   Height: 182.9 cm (72\")     Body mass index is 35.45 kg/m².    Physical Exam  Constitutional:       Appearance: Normal appearance. He is obese.      Comments: Obesity (BMI 30 - 39.9) Pt Current BMI = 35.45     HENT:      Head: Normocephalic and atraumatic.      Right Ear: External ear normal.      Left Ear: External ear normal.      Nose: Nose normal.   Eyes:      Extraocular Movements: Extraocular movements intact.      Conjunctiva/sclera: Conjunctivae normal.   Pulmonary:      Effort: Pulmonary effort is normal.   Musculoskeletal:         General: Normal range of motion.      Cervical back: Normal range of motion.   Skin:     General: Skin is warm and dry.   Neurological:      General: No focal deficit present.      Mental Status: He is alert and oriented to person, place, and time. Mental status is at baseline.   Psychiatric:         Mood and Affect: Mood normal.         Behavior: Behavior normal.         Thought Content: Thought content normal.         Judgment: Judgment normal.             Result Review :   The following data was reviewed by: PAOLO Tovar on 03/21/2024:    Most Recent A1C          3/21/2024    09:09   HGBA1C Most Recent   Hemoglobin A1C 8.6        A1C Last 3 Results          3/21/2024    09:09   HGBA1C Last 3 Results "   Hemoglobin A1C 8.6      A1c collected in the office today is 8.6%, indicating Uncontrolled Type II diabetes.     Glucose   Date Value Ref Range Status   03/21/2024 194 (H) 70 - 99 mg/dL Final     Comment:     Serial Number: [id number]:  [id number]     Point of care glucose in the office today is  elevated at 194 mg/dL.              Assessment: Patient was referred to our clinic by his primary care provider for management of his diabetes.  Reports he was noncompliant with taking his medication until 3 weeks ago when he went to the ER for hyperglycemia.  States his glucose was over 300 mg/dL.  States he is currently taking metformin 500 mg twice a day and Amaryl 4 mg once daily and farxiga 10mg once daily.  Admits he is not currently checking his blood sugar. He has tried and failed Ozempic and Mounjaro in the past due to GI intolerability.  States he has been working on his diet as he is decreasing his carbohydrate and sugar intake.  He has met with the diabetic nurse educator and dietitian in the past and defers the services at this time.  His A1c in the office today is 8.6% indicating uncontrolled type 2 diabetes.      Diagnoses and all orders for this visit:    1. Uncontrolled type 2 diabetes mellitus with hyperglycemia (Primary)  -     POC Glycosylated Hemoglobin (Hb A1C)    2. Type 2 diabetes mellitus with hyperglycemia, without long-term current use of insulin  -     Discontinue: Lancets (accu-chek multiclix) lancets; Check glucose daily in the morning.  Dispense: 100 each; Refill: 5  -     Discontinue: Accu-Chek Crystal Plus test strip; Check glucose daily in the morning.  Dispense: 100 each; Refill: 5  -     Continuous Blood Gluc Sensor (FreeStyle Jonathan 3 Sensor) misc; Use 1 each Every 14 (Fourteen) Days.  Dispense: 2 each; Refill: 5  -     Accu-Chek Crystal Plus test strip; Check glucose daily in the morning.  Dispense: 100 each; Refill: 5  -     Lancets (accu-chek multiclix) lancets; Check glucose daily  in the morning.  Dispense: 100 each; Refill: 5    3. Type 2 diabetes mellitus with diabetic neuropathy, without long-term current use of insulin    Other orders  -     POC Glucose        Plan: A myles 3 was placed on the left posterior arm and the angela was downloaded for the patient.  A booklet on the myles 3 was given to the patient as well.  A prescription for myles 3 was sent to his pharmacy.  Discussed importance of medication compliance and complications associated with diabetes with patient to include MI, CVA, blindness, renal failure and lower limb amputations. Scheduled a 2-week follow-up and we will review his CGM at that time and discuss plan of care going forward.      The patient will monitor his blood glucose levels per CGM.  If he develops problematic hyperglycemia or hypoglycemia or adverse drug reactions, he will contact the office for further instructions.        Follow Up     Return in about 2 weeks (around 4/4/2024) for CGM Follow Up, Medication Mgmt.    Patient was given instructions and counseling regarding his condition or for health maintenance advice. Please see specific information pulled into the AVS if appropriate.     Naya Ca, APRN  03/21/2024      Dictated Utilizing Dragon Dictation.  Please note that portions of this note were completed with a voice recognition program.  Part of this note may be an electronic transcription/translation of spoken language to printed text using the Dragon Dictation System.

## 2024-03-21 NOTE — TELEPHONE ENCOUNTER
Rupert Briscoe (Key: [id number])  Rx #: [id number]  Drug  FreeStyle Jonathan 3 SensorApprovedtoday  The request has been approved. The authorization is effective from 03/21/2024 to 03/21/2025, as long as the member is enrolled in their current health plan. A written notification letter will follow with additional details.        PLEASE SEE ABOVE THE APPROVAL FOR THE JONATHAN 3, NO PA FOR A ONE TOUCH HAS BEEN RECEIVED AS OF 3-25-24      NO PA NEEDED FOR TESTING SUPPLIES, PT NOTIFIED  3-25-24

## 2024-03-22 ENCOUNTER — PATIENT ROUNDING (BHMG ONLY) (OUTPATIENT)
Dept: DIABETES SERVICES | Facility: HOSPITAL | Age: 54
End: 2024-03-22
Payer: COMMERCIAL

## 2024-03-22 NOTE — PROGRESS NOTES
March 22, 2024    Hello, may I speak with Rupert Briscoe?    My name is sandip    I am  with [facility]  [address] [place] [address] [facility] [address] [phone].    Before we get started may I verify your date of birth? 1970    I am calling to officially welcome you to our practice and ask about your recent visit. Is this a good time to talk? no      Voice message left per script.

## 2024-03-25 ENCOUNTER — TELEPHONE (OUTPATIENT)
Dept: DIABETES SERVICES | Facility: HOSPITAL | Age: 54
End: 2024-03-25
Payer: COMMERCIAL

## 2024-03-25 ENCOUNTER — PATIENT MESSAGE (OUTPATIENT)
Dept: DIABETES SERVICES | Facility: HOSPITAL | Age: 54
End: 2024-03-25
Payer: COMMERCIAL

## 2024-03-25 ENCOUNTER — DOCUMENTATION (OUTPATIENT)
Dept: DIABETES SERVICES | Facility: HOSPITAL | Age: 54
End: 2024-03-25
Payer: COMMERCIAL

## 2024-03-25 DIAGNOSIS — E11.65 TYPE 2 DIABETES MELLITUS WITH HYPERGLYCEMIA, WITHOUT LONG-TERM CURRENT USE OF INSULIN: Primary | ICD-10-CM

## 2024-03-25 DIAGNOSIS — E11.65 UNCONTROLLED TYPE 2 DIABETES MELLITUS WITH HYPERGLYCEMIA: ICD-10-CM

## 2024-03-25 RX ORDER — LANCETS 30 GAUGE
EACH MISCELLANEOUS
Qty: 100 EACH | Refills: 5 | Status: SHIPPED | OUTPATIENT
Start: 2024-03-25

## 2024-03-25 NOTE — TELEPHONE ENCOUNTER
You are welcome, Yes I have notified him as well, and received a PA request this morning for his testing supplies,PER PHARMACY NO PA NEEDED FOR TESTING SUPPLIES, AND READY FOR , PT NOTIFIED

## 2024-04-03 ENCOUNTER — TELEPHONE (OUTPATIENT)
Dept: DIABETES SERVICES | Facility: HOSPITAL | Age: 54
End: 2024-04-03
Payer: COMMERCIAL

## 2024-04-03 NOTE — TELEPHONE ENCOUNTER
Patient called and asked about removal of Jonathan 3 sensor.  I explained on his cell phone he would receive a message to change sensor.  I explained to remove it like a Band-Aid.

## 2024-04-08 NOTE — PROGRESS NOTES
Chief Complaint  Follow-up ([facility], CGM)    Referred By: PAOLO Mae presents to [facility] for diabetes medication management    History of Present Illness    Visit type:  follow-up  Diabetes type:  Type 2  Current diabetes status/concerns/issues:Reports the Jonathan 3  helps him closer monitor his glucose levels. States he has noticed if he just eats a snack for supper meal he will have nocturnal hypoglycemia. States he will normally eat something sweet to bring it up. States his fbs is normally less than 150mg/dl.   Other health concerns: No new health concerns  Current Diabetes symptoms:    Polyuria: Yes     Polydipsia: Yes     Polyphagia: No   Blurred vision: No   Excessive fatigue: Yes    Known Diabetes complications:  Neuropathy: Numbness and Tingling; Location: Feet  Renal: None  Eyes: No current eye exam available in record; Location: N/A; Last Eye Exam:  over a year ago ; Location:   Amputation/Wounds: None  GI: Constipation, Diarrhea, Reflux, and Indigestion  Cardiovascular: Hypertension and Hyperlipidemia  ED: Patient Reported  Other: None  Hypoglycemia:  Level 1 hypoglycemia (54 mg/dL - 70 mg/dL); Frequency - 1% per CGM  Hypoglycemia Symptoms:   denies any symptoms of hypoglycemia  Current diabetes treatment:  metformin 500mg bid, amaryl 2mg qd, farxiga 10mg qd   Blood glucose device:  Jonathan CGM  Blood glucose monitoring frequency:  Continuous per CGM  Blood glucose range/average:  The 14-day sensor report shows an average glucose of 114mg/dL, with 95% in target range ( mgdL), 4% in the high range (181-250 mg/dL), 0% in the very high range (>250 mg/dL), 1% in the low range (54-70 mg/dL) and 0% in the very low range (<54 mg/dL).   Glucose Source: Device Reviewed  Dietary behavior:  Limits high carb/sweet foods, Avoids sugary drinks, Number of meals each day - 3; Number of snacks each day - 2. States he is  now eating in moderation  Activity/Exercise:  None    Past Medical History:   Diagnosis Date    Allergic     Anxiety     Colon polyp     Depression     Diabetes mellitus     DOESN'T CHECK BG AT HOME    Diverticulosis     Hernia of abdominal wall     Hyperlipidemia     Hypertension     Migraine     Pancreatitis     Prostatitis     Shortness of breath     DENIED ANY CURRENTLY    Sinusitis     Umbilical hernia     Urinary tract infection     NO CURRENT PROBLEMS     Past Surgical History:   Procedure Laterality Date    APPENDECTOMY      CHOLECYSTECTOMY      COLON SURGERY      R/T DIVERTICULITIS    HEMORRHOIDECTOMY      HERNIA REPAIR      X2    MANDIBLE FRACTURE SURGERY      TONSILLECTOMY      UMBILICAL HERNIA REPAIR N/A 4/20/2022    Procedure: UMBILICAL HERNIA REPAIR LAPAROSCOPIC;  Surgeon: Tad Ortiz MD;  Location: [facility] MAIN OR;  Service: General;  Laterality: N/A;     Family History   Adopted: Yes   Family history unknown: Yes     Social History     Socioeconomic History    Marital status:    Tobacco Use    Smoking status: Every Day     Current packs/day: 0.25     Types: Cigarettes    Smokeless tobacco: Never    Tobacco comments:     'quit last year' - smoked 30+ years - INSTRUCTED NO SMOKING 24 HR PRIOR TO SURGERY   Vaping Use    Vaping status: Every Day    Substances: Nicotine    Devices: Disposable   Substance and Sexual Activity    Alcohol use: Not Currently     Comment: soc     Drug use: No    Sexual activity: Yes     Partners: Female     Birth control/protection: None     No Known Allergies    Current Outpatient Medications:     Accu-Chek Crystal Plus test strip, Check glucose daily in the morning., Disp: 100 each, Rfl: 5    atorvastatin (LIPITOR) 20 MG tablet, Take 1 tablet by mouth Daily., Disp: , Rfl:     Blood Glucose Monitoring Suppl (ACCU-CHEK CRYSTAL PLUS) w/Device kit, Check glucose daily in the morning., Disp: 1 kit, Rfl: 0    Blood Glucose Monitoring Suppl device, Use as directed for blood  glucose monitoring, Disp: 1 each, Rfl: 0    Blood Glucose Monitoring Suppl device, Use as directed for blood glucose monitoring, Disp: 1 each, Rfl: 0    busPIRone (BUSPAR) 10 MG tablet, Take 1 tablet by mouth 3 (Three) Times a Day., Disp: , Rfl:     cholecalciferol (VITAMIN D3) 25 MCG (1000 UT) tablet, Take 2 tablets by mouth Daily., Disp: , Rfl:     Continuous Blood Gluc Sensor (FreeStyle Jonathan 3 Sensor) misc, Use 1 each Every 14 (Fourteen) Days., Disp: 2 each, Rfl: 5    dapagliflozin Propanediol 10 MG tablet, Take 10 mg by mouth Daily for 180 days., Disp: 90 tablet, Rfl: 1    glimepiride (AMARYL) 2 MG tablet, Take 2 tablets by mouth Every Morning Before Breakfast for 180 days., Disp: 180 tablet, Rfl: 1    glucose blood test strip, Test blood glucose 1 times each day, Disp: 100 each, Rfl: 5    glucose blood test strip, Test blood glucose 1 times each day, Disp: 100 each, Rfl: 5    Lancets (accu-chek multiclix) lancets, Check glucose daily in the morning., Disp: 100 each, Rfl: 5    Lancets misc, Test blood glucose 1 times each day, Disp: 100 each, Rfl: 5    Lancets misc, Test blood glucose 1 times each day, Disp: 100 each, Rfl: 5    levocetirizine (XYZAL) 5 MG tablet, Take 1 tablet by mouth Daily., Disp: , Rfl:     lisinopril (PRINIVIL,ZESTRIL) 5 MG tablet, Take 1 tablet by mouth Daily., Disp: , Rfl:     metFORMIN (GLUCOPHAGE) 500 MG tablet, Take 1 tablet by mouth 2 (Two) Times a Day With Meals for 180 days., Disp: 180 tablet, Rfl: 1    omeprazole (priLOSEC) 40 MG capsule, Take 1 capsule by mouth Daily., Disp: , Rfl:     OXcarbazepine (TRILEPTAL) 300 MG tablet, Take 1 tablet by mouth 2 (Two) Times a Day., Disp: , Rfl:     vitamin D (ERGOCALCIFEROL) 1.25 MG (92800 UT) capsule capsule, , Disp: , Rfl:     Vraylar 3 MG capsule capsule, Take 1 capsule by mouth Daily., Disp: , Rfl:     Objective     Vitals:    04/10/24 0940   BP: 110/68   BP Location: Left arm   Patient Position: Sitting   Cuff Size: Adult   Pulse: 93  "  Temp: 98 °F (36.7 °C)   TempSrc: Temporal   SpO2: 94%   Weight: 114 kg (250 lb 6.4 oz)   Height: 182.9 cm (72\")     Body mass index is 33.96 kg/m².    Physical Exam  Constitutional:       Appearance: Normal appearance. He is obese.      Comments: Obesity (BMI 30 - 39.9) Pt Current BMI = 33.96     HENT:      Head: Normocephalic and atraumatic.      Right Ear: External ear normal.      Left Ear: External ear normal.      Nose: Nose normal.   Eyes:      Extraocular Movements: Extraocular movements intact.      Conjunctiva/sclera: Conjunctivae normal.   Pulmonary:      Effort: Pulmonary effort is normal.   Musculoskeletal:         General: Normal range of motion.      Cervical back: Normal range of motion.   Skin:     General: Skin is warm and dry.   Neurological:      General: No focal deficit present.      Mental Status: He is alert and oriented to person, place, and time. Mental status is at baseline.   Psychiatric:         Mood and Affect: Mood normal.         Behavior: Behavior normal.         Thought Content: Thought content normal.         Judgment: Judgment normal.             Result Review :   The following data was reviewed by: PAOLO Tovar on 04/10/2024:    Most Recent A1C          3/21/2024    09:09   HGBA1C Most Recent   Hemoglobin A1C 8.6        A1C Last 3 Results          3/21/2024    09:09   HGBA1C Last 3 Results   Hemoglobin A1C 8.6      A1c collected on 3/21/24  is 8.6%, indicating Uncontrolled Type II diabetes.     Glucose   Date Value Ref Range Status   04/10/2024 205 (H) 70 - 99 mg/dL Final     Comment:     Serial Number: [id number]:  [id number]     Point of care glucose in the office today is  elevated at 205mg/dl               Assessment: Patient is here today for 2-week follow-up on his diabetes.  Last visit a jonathan 3 was ordered for closer monitoring of his glucose levels.Reports the Jonathan 3  helps him closer monitor his glucose levels. States he has noticed if he just " eats a snack for supper meal he will have nocturnal hypoglycemia. States he will normally eat something sweet to bring it up. States his fbs is normally less than 150mg/dl.  States he is still eating carb and sugar however he is now eating in moderation.  Reviewed CGM report with patient in the office today.The 14-day sensor report shows an average glucose of 114mg/dL, with 95% in target range ( mgdL), 4% in the high range (181-250 mg/dL), 0% in the very high range (>250 mg/dL), 1% in the low range (54-70 mg/dL) and 0% in the very low range (<54 mg/dL).  His A1c on 3/21/2024 was 8.6% indicating uncontrolled type 2 diabetes.  In review of his CGM his GMI is 6% showing his glucose levels have significantly improved.      Diagnoses and all orders for this visit:    1. Uncontrolled type 2 diabetes mellitus with hyperglycemia (Primary)    2. Type 2 diabetes mellitus with hyperglycemia, without long-term current use of insulin  -     metFORMIN (GLUCOPHAGE) 500 MG tablet; Take 1 tablet by mouth 2 (Two) Times a Day With Meals for 180 days.  Dispense: 180 tablet; Refill: 1  -     glimepiride (AMARYL) 2 MG tablet; Take 2 tablets by mouth Every Morning Before Breakfast for 180 days.  Dispense: 180 tablet; Refill: 1  -     dapagliflozin Propanediol 10 MG tablet; Take 10 mg by mouth Daily for 180 days.  Dispense: 90 tablet; Refill: 1    3. Type 2 diabetes mellitus with diabetic neuropathy, without long-term current use of insulin    4. Uses self-applied continuous glucose monitoring device    5. Obesity (BMI 30-39.9)    Other orders  -     POC Glucose        Plan: No changes were made to his plan of care today since his glucose levels are trending down.  Discussed with patient if he continues his current trend of his glucose levels his next A1c will be within goal.  Scheduled a 2-month follow-up and we will recheck his A1c at that time.    The patient will monitor his blood glucose levels per CGM.  If he develops  problematic hyperglycemia or hypoglycemia or adverse drug reactions, he will contact the office for further instructions.        Follow Up     Return in about 2 months (around 6/10/2024) for CGM Follow Up, Medication Mgmt.    Patient was given instructions and counseling regarding his condition or for health maintenance advice. Please see specific information pulled into the AVS if appropriate.     Naya Ca, PAOLO  04/10/2024      Dictated Utilizing Dragon Dictation.  Please note that portions of this note were completed with a voice recognition program.  Part of this note may be an electronic transcription/translation of spoken language to printed text using the Dragon Dictation System.

## 2024-04-10 ENCOUNTER — OFFICE VISIT (OUTPATIENT)
Dept: DIABETES SERVICES | Facility: HOSPITAL | Age: 54
End: 2024-04-10
Payer: COMMERCIAL

## 2024-04-10 ENCOUNTER — TELEPHONE (OUTPATIENT)
Dept: DIABETES SERVICES | Facility: HOSPITAL | Age: 54
End: 2024-04-10

## 2024-04-10 VITALS
BODY MASS INDEX: 33.92 KG/M2 | HEART RATE: 93 BPM | WEIGHT: 250.4 LBS | DIASTOLIC BLOOD PRESSURE: 68 MMHG | TEMPERATURE: 98 F | SYSTOLIC BLOOD PRESSURE: 110 MMHG | HEIGHT: 72 IN | OXYGEN SATURATION: 94 %

## 2024-04-10 DIAGNOSIS — Z97.8 USES SELF-APPLIED CONTINUOUS GLUCOSE MONITORING DEVICE: ICD-10-CM

## 2024-04-10 DIAGNOSIS — E11.65 TYPE 2 DIABETES MELLITUS WITH HYPERGLYCEMIA, WITHOUT LONG-TERM CURRENT USE OF INSULIN: ICD-10-CM

## 2024-04-10 DIAGNOSIS — E11.40 TYPE 2 DIABETES MELLITUS WITH DIABETIC NEUROPATHY, WITHOUT LONG-TERM CURRENT USE OF INSULIN: ICD-10-CM

## 2024-04-10 DIAGNOSIS — E66.9 OBESITY (BMI 30-39.9): ICD-10-CM

## 2024-04-10 DIAGNOSIS — E11.65 UNCONTROLLED TYPE 2 DIABETES MELLITUS WITH HYPERGLYCEMIA: Primary | ICD-10-CM

## 2024-04-10 LAB — GLUCOSE BLDC GLUCOMTR-MCNC: 205 MG/DL (ref 70–99)

## 2024-04-10 PROCEDURE — G0463 HOSPITAL OUTPT CLINIC VISIT: HCPCS | Performed by: NURSE PRACTITIONER

## 2024-04-10 PROCEDURE — 1160F RVW MEDS BY RX/DR IN RCRD: CPT | Performed by: NURSE PRACTITIONER

## 2024-04-10 PROCEDURE — 3074F SYST BP LT 130 MM HG: CPT | Performed by: NURSE PRACTITIONER

## 2024-04-10 PROCEDURE — 1159F MED LIST DOCD IN RCRD: CPT | Performed by: NURSE PRACTITIONER

## 2024-04-10 PROCEDURE — 3078F DIAST BP <80 MM HG: CPT | Performed by: NURSE PRACTITIONER

## 2024-04-10 PROCEDURE — 95251 CONT GLUC MNTR ANALYSIS I&R: CPT | Performed by: NURSE PRACTITIONER

## 2024-04-10 PROCEDURE — 99213 OFFICE O/P EST LOW 20 MIN: CPT | Performed by: NURSE PRACTITIONER

## 2024-04-10 PROCEDURE — 82948 REAGENT STRIP/BLOOD GLUCOSE: CPT | Performed by: NURSE PRACTITIONER

## 2024-04-10 PROCEDURE — 3052F HG A1C>EQUAL 8.0%<EQUAL 9.0%: CPT | Performed by: NURSE PRACTITIONER

## 2024-04-10 RX ORDER — GLIMEPIRIDE 2 MG/1
4 TABLET ORAL
Qty: 180 TABLET | Refills: 1 | Status: SHIPPED | OUTPATIENT
Start: 2024-04-10 | End: 2024-10-07

## 2024-04-10 RX ORDER — DAPAGLIFLOZIN 10 MG/1
10 TABLET, FILM COATED ORAL DAILY
Qty: 90 TABLET | Refills: 1 | Status: SHIPPED | OUTPATIENT
Start: 2024-04-10 | End: 2024-10-07

## 2024-04-10 NOTE — TELEPHONE ENCOUNTER
Rupert Briscoe (Key: [id number])  Rx #: [id number]  Dapagliflozin Propanediol 10MG tabletsApprovedtoday  The request has been approved. The authorization is effective from 04/10/2024 to 04/10/2025, as long as the member is enrolled in their current health plan. A written notification letter will follow with additional details.

## 2024-04-29 ENCOUNTER — APPOINTMENT (OUTPATIENT)
Dept: GENERAL RADIOLOGY | Facility: HOSPITAL | Age: 54
End: 2024-04-29
Payer: COMMERCIAL

## 2024-04-29 ENCOUNTER — APPOINTMENT (OUTPATIENT)
Dept: CT IMAGING | Facility: HOSPITAL | Age: 54
End: 2024-04-29
Payer: COMMERCIAL

## 2024-04-29 ENCOUNTER — HOSPITAL ENCOUNTER (EMERGENCY)
Facility: HOSPITAL | Age: 54
Discharge: HOME | End: 2024-04-30
Attending: EMERGENCY MEDICINE
Payer: COMMERCIAL

## 2024-04-29 DIAGNOSIS — R51.9 NONINTRACTABLE HEADACHE, UNSPECIFIED CHRONICITY PATTERN, UNSPECIFIED HEADACHE TYPE: Primary | ICD-10-CM

## 2024-04-29 DIAGNOSIS — R42 DIZZINESS: ICD-10-CM

## 2024-04-29 LAB
ALBUMIN SERPL-MCNC: 4.2 G/DL (ref 3.5–5.2)
ALBUMIN/GLOB SERPL: 1.7 G/DL
ALP SERPL-CCNC: 80 U/L (ref 39–117)
ALT SERPL W P-5'-P-CCNC: 22 U/L (ref 1–41)
ANION GAP SERPL CALCULATED.3IONS-SCNC: 11.1 MMOL/L (ref 5–15)
AST SERPL-CCNC: 26 U/L (ref 1–40)
BACTERIA UR QL AUTO: ABNORMAL /HPF
BASOPHILS # BLD AUTO: 0.08 10*3/MM3 (ref 0–0.2)
BASOPHILS NFR BLD AUTO: 0.9 % (ref 0–1.5)
BILIRUB SERPL-MCNC: 0.3 MG/DL (ref 0–1.2)
BILIRUB UR QL STRIP: NEGATIVE
BUN SERPL-MCNC: 16 MG/DL (ref 6–20)
BUN/CREAT SERPL: 17.2 (ref 7–25)
CALCIUM SPEC-SCNC: 9.9 MG/DL (ref 8.6–10.5)
CHLORIDE SERPL-SCNC: 104 MMOL/L (ref 98–107)
CLARITY UR: CLEAR
CO2 SERPL-SCNC: 26.9 MMOL/L (ref 22–29)
COLOR UR: YELLOW
CREAT SERPL-MCNC: 0.93 MG/DL (ref 0.76–1.27)
DEPRECATED RDW RBC AUTO: 42.9 FL (ref 37–54)
EGFRCR SERPLBLD CKD-EPI 2021: 97.6 ML/MIN/1.73
EOSINOPHIL # BLD AUTO: 0.09 10*3/MM3 (ref 0–0.4)
EOSINOPHIL NFR BLD AUTO: 1.1 % (ref 0.3–6.2)
ERYTHROCYTE [DISTWIDTH] IN BLOOD BY AUTOMATED COUNT: 13.6 % (ref 12.3–15.4)
GLOBULIN UR ELPH-MCNC: 2.5 GM/DL
GLUCOSE SERPL-MCNC: 169 MG/DL (ref 65–99)
GLUCOSE UR STRIP-MCNC: ABNORMAL MG/DL
HCT VFR BLD AUTO: 47.1 % (ref 37.5–51)
HGB BLD-MCNC: 15 G/DL (ref 13–17.7)
HGB UR QL STRIP.AUTO: ABNORMAL
HOLD SPECIMEN: NORMAL
HOLD SPECIMEN: NORMAL
HYALINE CASTS UR QL AUTO: ABNORMAL /LPF
IMM GRANULOCYTES # BLD AUTO: 0.03 10*3/MM3 (ref 0–0.05)
IMM GRANULOCYTES NFR BLD AUTO: 0.4 % (ref 0–0.5)
KETONES UR QL STRIP: ABNORMAL
LEUKOCYTE ESTERASE UR QL STRIP.AUTO: NEGATIVE
LYMPHOCYTES # BLD AUTO: 2.45 10*3/MM3 (ref 0.7–3.1)
LYMPHOCYTES NFR BLD AUTO: 28.9 % (ref 19.6–45.3)
MAGNESIUM SERPL-MCNC: 1.9 MG/DL (ref 1.6–2.6)
MCH RBC QN AUTO: 27.7 PG (ref 26.6–33)
MCHC RBC AUTO-ENTMCNC: 31.8 G/DL (ref 31.5–35.7)
MCV RBC AUTO: 86.9 FL (ref 79–97)
MONOCYTES # BLD AUTO: 0.56 10*3/MM3 (ref 0.1–0.9)
MONOCYTES NFR BLD AUTO: 6.6 % (ref 5–12)
NEUTROPHILS NFR BLD AUTO: 5.27 10*3/MM3 (ref 1.7–7)
NEUTROPHILS NFR BLD AUTO: 62.1 % (ref 42.7–76)
NITRITE UR QL STRIP: NEGATIVE
NRBC BLD AUTO-RTO: 0 /100 WBC (ref 0–0.2)
PH UR STRIP.AUTO: 6.5 [PH] (ref 5–8)
PLATELET # BLD AUTO: 243 10*3/MM3 (ref 140–450)
PMV BLD AUTO: 10.8 FL (ref 6–12)
POTASSIUM SERPL-SCNC: 3.7 MMOL/L (ref 3.5–5.2)
PROT SERPL-MCNC: 6.7 G/DL (ref 6–8.5)
PROT UR QL STRIP: NEGATIVE
RBC # BLD AUTO: 5.42 10*6/MM3 (ref 4.14–5.8)
RBC # UR STRIP: ABNORMAL /HPF
REF LAB TEST METHOD: ABNORMAL
SODIUM SERPL-SCNC: 142 MMOL/L (ref 136–145)
SP GR UR STRIP: >1.03 (ref 1–1.03)
SQUAMOUS #/AREA URNS HPF: ABNORMAL /HPF
TROPONIN T SERPL HS-MCNC: 14 NG/L
UROBILINOGEN UR QL STRIP: ABNORMAL
WBC # UR STRIP: ABNORMAL /HPF
WBC NRBC COR # BLD AUTO: 8.48 10*3/MM3 (ref 3.4–10.8)
WHOLE BLOOD HOLD COAG: NORMAL
WHOLE BLOOD HOLD SPECIMEN: NORMAL

## 2024-04-29 PROCEDURE — 80053 COMPREHEN METABOLIC PANEL: CPT

## 2024-04-29 PROCEDURE — 71045 X-RAY EXAM CHEST 1 VIEW: CPT

## 2024-04-29 PROCEDURE — 84484 ASSAY OF TROPONIN QUANT: CPT

## 2024-04-29 PROCEDURE — 93005 ELECTROCARDIOGRAM TRACING: CPT

## 2024-04-29 PROCEDURE — 81001 URINALYSIS AUTO W/SCOPE: CPT

## 2024-04-29 PROCEDURE — 70450 CT HEAD/BRAIN W/O DYE: CPT

## 2024-04-29 PROCEDURE — 96374 THER/PROPH/DIAG INJ IV PUSH: CPT

## 2024-04-29 PROCEDURE — 25010000002 PROCHLORPERAZINE 10 MG/2ML SOLUTION: Performed by: EMERGENCY MEDICINE

## 2024-04-29 PROCEDURE — 25810000003 SODIUM CHLORIDE 0.9 % SOLUTION: Performed by: EMERGENCY MEDICINE

## 2024-04-29 PROCEDURE — 36415 COLL VENOUS BLD VENIPUNCTURE: CPT

## 2024-04-29 PROCEDURE — 96375 TX/PRO/DX INJ NEW DRUG ADDON: CPT

## 2024-04-29 PROCEDURE — 83735 ASSAY OF MAGNESIUM: CPT

## 2024-04-29 PROCEDURE — 25010000002 DIPHENHYDRAMINE PER 50 MG: Performed by: EMERGENCY MEDICINE

## 2024-04-29 PROCEDURE — 85025 COMPLETE CBC W/AUTO DIFF WBC: CPT

## 2024-04-29 PROCEDURE — 25010000002 KETOROLAC TROMETHAMINE PER 15 MG: Performed by: EMERGENCY MEDICINE

## 2024-04-29 PROCEDURE — 93010 ELECTROCARDIOGRAM REPORT: CPT | Performed by: INTERNAL MEDICINE

## 2024-04-29 PROCEDURE — 99284 EMERGENCY DEPT VISIT MOD MDM: CPT

## 2024-04-29 PROCEDURE — 93005 ELECTROCARDIOGRAM TRACING: CPT | Performed by: EMERGENCY MEDICINE

## 2024-04-29 RX ORDER — DIPHENHYDRAMINE HYDROCHLORIDE 50 MG/ML
25 INJECTION INTRAMUSCULAR; INTRAVENOUS ONCE
Status: COMPLETED | OUTPATIENT
Start: 2024-04-29 | End: 2024-04-29

## 2024-04-29 RX ORDER — KETOROLAC TROMETHAMINE 30 MG/ML
30 INJECTION, SOLUTION INTRAMUSCULAR; INTRAVENOUS ONCE
Status: COMPLETED | OUTPATIENT
Start: 2024-04-29 | End: 2024-04-29

## 2024-04-29 RX ORDER — SODIUM CHLORIDE 0.9 % (FLUSH) 0.9 %
10 SYRINGE (ML) INJECTION AS NEEDED
Status: DISCONTINUED | OUTPATIENT
Start: 2024-04-29 | End: 2024-04-30 | Stop reason: HOSPADM

## 2024-04-29 RX ORDER — PROCHLORPERAZINE EDISYLATE 5 MG/ML
10 INJECTION INTRAMUSCULAR; INTRAVENOUS ONCE
Status: COMPLETED | OUTPATIENT
Start: 2024-04-29 | End: 2024-04-29

## 2024-04-29 RX ADMIN — DIPHENHYDRAMINE HYDROCHLORIDE 25 MG: 50 INJECTION, SOLUTION INTRAMUSCULAR; INTRAVENOUS at 23:56

## 2024-04-29 RX ADMIN — KETOROLAC TROMETHAMINE 30 MG: 30 INJECTION, SOLUTION INTRAMUSCULAR; INTRAVENOUS at 23:53

## 2024-04-29 RX ADMIN — PROCHLORPERAZINE EDISYLATE 10 MG: 5 INJECTION INTRAMUSCULAR; INTRAVENOUS at 23:54

## 2024-04-29 RX ADMIN — SODIUM CHLORIDE 1000 ML: 9 INJECTION, SOLUTION INTRAVENOUS at 23:53

## 2024-04-29 NOTE — Clinical Note
[facility] EMERGENCY ROOM  [address] [place] [address]  Phone: [phone]  Fax: [phone]    Rupert Briscoe was seen and treated in our emergency department on 4/29/2024.  He may return to work on 05/01/2024.         Thank you for choosing [facility].    Elder Toure MD

## 2024-04-29 NOTE — ED PROVIDER NOTES
Time: 7:15 PM EDT  Date of encounter:  4/29/2024  Independent Historian/Clinical History and Information was obtained by:   Patient    History is limited by: N/A    Chief Complaint: Headache      History of Present Illness:  Patient is a 53 y.o. year old male who presents to the emergency department for evaluation of headache with dizziness.  Patient seen before and had a CT and was told he has small vessels in his brain.  Patient was supposed to have an MRI twice and was too scared to get it done. Had head CT last month  Patient states symptoms started 2 days ago.  Denies any alleviating or aggravating factors.  He denies any issues with ambulation.  He denies any problems with balance.  Denies any focal deficits.  Describes dizziness as feeling lightheaded as well as occasional spinning sensation.  No other symptoms.  He report similar episodes in the past      HPI    Patient Care Team  Primary Care Provider: Cassandra Cassidy APRN    Past Medical History:     No Known Allergies  Past Medical History:   Diagnosis Date    Allergic     Anxiety     Colon polyp     Depression     Diabetes mellitus     DOESN'T CHECK BG AT HOME    Diverticulosis     Hernia of abdominal wall     Hyperlipidemia     Hypertension     Migraine     Pancreatitis     Prostatitis     Shortness of breath     DENIED ANY CURRENTLY    Sinusitis     Umbilical hernia     Urinary tract infection     NO CURRENT PROBLEMS     Past Surgical History:   Procedure Laterality Date    APPENDECTOMY      CHOLECYSTECTOMY      COLON SURGERY      R/T DIVERTICULITIS    HEMORRHOIDECTOMY      HERNIA REPAIR      X2    MANDIBLE FRACTURE SURGERY      TONSILLECTOMY      UMBILICAL HERNIA REPAIR N/A 4/20/2022    Procedure: UMBILICAL HERNIA REPAIR LAPAROSCOPIC;  Surgeon: Tad Ortiz MD;  Location: [facility] MAIN OR;  Service: General;  Laterality: N/A;     Family History   Adopted: Yes   Family history unknown: Yes       Home Medications:  Prior to Admission medications     Medication Sig Start Date End Date Taking? Authorizing Provider   Accu-Chek Sonu Plus test strip Check glucose daily in the morning. 3/21/24   Naya Ca APRN   atorvastatin (LIPITOR) 20 MG tablet Take 1 tablet by mouth Daily. 5/15/23   Odessa Graves MD   Blood Glucose Monitoring Suppl (ACCU-CHEK SONU PLUS) w/Device kit Check glucose daily in the morning. 5/30/19   Evan Dumont DO   Blood Glucose Monitoring Suppl device Use as directed for blood glucose monitoring 3/21/24   Naya Ca APRN   Blood Glucose Monitoring Suppl device Use as directed for blood glucose monitoring 3/25/24   Naya Ca APRN   busPIRone (BUSPAR) 10 MG tablet Take 1 tablet by mouth 3 (Three) Times a Day. 5/6/23   Odessa Graves MD   cholecalciferol (VITAMIN D3) 25 MCG (1000 UT) tablet Take 2 tablets by mouth Daily. 4/15/22   Odessa Graves MD   Continuous Blood Gluc Sensor (FreeStyle Jonathan 3 Sensor) misc Use 1 each Every 14 (Fourteen) Days. 3/21/24   Naya Ca APRN   dapagliflozin Propanediol 10 MG tablet Take 10 mg by mouth Daily for 180 days. 4/10/24 10/7/24  Naya Ca APRN   glimepiride (AMARYL) 2 MG tablet Take 2 tablets by mouth Every Morning Before Breakfast for 180 days. 4/10/24 10/7/24  Naya Ca APRN   glucose blood test strip Test blood glucose 1 times each day 3/21/24   Naya Ca APRN   glucose blood test strip Test blood glucose 1 times each day 3/25/24   Naya Ca APRN   Lancets (accu-chek multiclix) lancets Check glucose daily in the morning. 3/21/24   Naya Ca APRN   Lancets misc Test blood glucose 1 times each day 3/21/24   Naya Ca APRN   Lancets misc Test blood glucose 1 times each day 3/25/24   Naya Ca APRN   levocetirizine (XYZAL) 5 MG tablet Take 1 tablet by mouth Daily. 11/1/23   Provider, MD Odessa   lisinopril  "(PRINIVIL,ZESTRIL) 5 MG tablet Take 1 tablet by mouth Daily. 3/8/23   Odessa Graves MD   metFORMIN (GLUCOPHAGE) 500 MG tablet Take 1 tablet by mouth 2 (Two) Times a Day With Meals for 180 days. 4/10/24 10/7/24  Naya Ca APRN   omeprazole (priLOSEC) 40 MG capsule Take 1 capsule by mouth Daily. 3/8/23   Odessa Graves MD   OXcarbazepine (TRILEPTAL) 300 MG tablet Take 1 tablet by mouth 2 (Two) Times a Day. 2/28/22   Odessa Graves MD   vitamin D (ERGOCALCIFEROL) 1.25 MG (27953 UT) capsule capsule  5/15/23   Odessa Graves MD   Vraylar 3 MG capsule capsule Take 1 capsule by mouth Daily. 1/29/24   Odessa Graves MD        Social History:   Social History     Tobacco Use    Smoking status: Every Day     Current packs/day: 0.25     Types: Cigarettes    Smokeless tobacco: Never    Tobacco comments:     'quit last year' - smoked 30+ years - INSTRUCTED NO SMOKING 24 HR PRIOR TO SURGERY   Vaping Use    Vaping status: Every Day    Substances: Nicotine    Devices: Disposable   Substance Use Topics    Alcohol use: Not Currently     Comment: soc     Drug use: No         Review of Systems:  Review of Systems   Constitutional:  Negative for chills and fever.   HENT:  Negative for congestion, ear pain and sore throat.    Eyes:  Negative for pain.   Respiratory:  Negative for cough, chest tightness and shortness of breath.    Cardiovascular:  Negative for chest pain.   Gastrointestinal:  Positive for nausea. Negative for abdominal pain, diarrhea and vomiting.   Genitourinary:  Negative for flank pain and hematuria.   Musculoskeletal:  Negative for joint swelling.   Skin:  Negative for pallor.   Neurological:  Positive for dizziness and headaches. Negative for seizures.   All other systems reviewed and are negative.       Physical Exam:  /88 (BP Location: Right arm, Patient Position: Sitting)   Pulse 84   Temp 98.5 °F (36.9 °C) (Oral)   Resp 18   Ht 182.9 cm (72\")   Wt 114 " kg (251 lb 5.2 oz)   SpO2 93%   BMI 34.09 kg/m²     Physical Exam  Constitutional:       Appearance: Normal appearance.   HENT:      Head: Normocephalic and atraumatic.      Nose: Nose normal.      Mouth/Throat:      Mouth: Mucous membranes are moist.   Eyes:      Extraocular Movements: Extraocular movements intact.      Conjunctiva/sclera: Conjunctivae normal.      Pupils: Pupils are equal, round, and reactive to light.   Cardiovascular:      Rate and Rhythm: Normal rate and regular rhythm.      Pulses: Normal pulses.      Heart sounds: Normal heart sounds.   Pulmonary:      Effort: Pulmonary effort is normal.      Breath sounds: Normal breath sounds.   Abdominal:      General: There is no distension.      Palpations: Abdomen is soft.      Tenderness: There is no abdominal tenderness.   Musculoskeletal:         General: Normal range of motion.      Cervical back: Normal range of motion. No rigidity.   Skin:     General: Skin is warm and dry.      Capillary Refill: Capillary refill takes less than 2 seconds.      Coloration: Skin is not cyanotic.   Neurological:      General: No focal deficit present.      Mental Status: He is alert and oriented to person, place, and time. Mental status is at baseline.   Psychiatric:         Attention and Perception: Attention and perception normal.         Mood and Affect: Mood normal.         Behavior: Behavior normal.                  Procedures:  Procedures      Medical Decision Making:      Comorbidities that affect care:    Migraine, Diabetes, Hypertension    External Notes reviewed:    Previous Clinic Note: Patient seen by [facility] diabetes care on 4/10/2024 for uncontrolled diabetes, obesity.      The following orders were placed and all results were independently analyzed by me:  Orders Placed This Encounter   Procedures    XR Chest 1 View    CT Head Without Contrast    [place] Draw    Comprehensive Metabolic Panel    Single High Sensitivity Troponin T     Magnesium    Urinalysis With Microscopic If Indicated (No Culture) - Urine, Clean Catch    CBC Auto Differential    Urinalysis, Microscopic Only - Urine, Clean Catch    NPO Diet NPO Type: Strict NPO    Undress & Gown    Continuous Pulse Oximetry    Vital Signs    Orthostatic Blood Pressure    Oxygen Therapy- Nasal Cannula; Titrate 1-6 LPM Per SpO2; 90 - 95%    POC Glucose Once    ECG 12 Lead ED Triage Standing Order; Weak / Dizzy / AMS    Insert Peripheral IV    Fall Precautions    CBC & Differential    Green Top (Gel)    Lavender Top    Gold Top - SST    Light Blue Top       Medications Given in the Emergency Department:  Medications   sodium chloride 0.9 % flush 10 mL (has no administration in time range)   sodium chloride 0.9 % bolus 1,000 mL (0 mL Intravenous Stopped 4/30/24 0111)   prochlorperazine (COMPAZINE) injection 10 mg (10 mg Intravenous Given 4/29/24 2354)   diphenhydrAMINE (BENADRYL) injection 25 mg (25 mg Intravenous Given 4/29/24 2356)   ketorolac (TORADOL) injection 30 mg (30 mg Intravenous Given 4/29/24 2353)        ED Course:    ED Course as of 04/30/24 0211 Mon Apr 29, 2024 1917 --- PROVIDER IN TRIAGE NOTE ---    The patient was evaluated by Yoanna judge in triage. Orders were placed and the patient is currently awaiting disposition.    [AJ]   2317 ECG 12 Lead ED Triage Standing Order; Weak / Dizzy / AMS  Sinus rhythm rate of 85.  No acute ST elevation.  Normal GA and QTc.  EKG interpreted by me. [LD]      ED Course User Index  [AJ] Yoanna Horton PA-C  [LD] Elder Toure MD       Labs:    Lab Results (last 24 hours)       Procedure Component Value Units Date/Time    CBC & Differential [[id number]]  (Normal) Collected: 04/29/24 2035    Specimen: Blood Updated: 04/29/24 2040    Narrative:      The following orders were created for panel order CBC & Differential.  Procedure                               Abnormality         Status                     ---------                                -----------         ------                     CBC Auto Differential[[id number]]        Normal              Final result                 Please view results for these tests on the individual orders.    Comprehensive Metabolic Panel [[id number]]  (Abnormal) Collected: 04/29/24 2035    Specimen: Blood Updated: 04/29/24 2100     Glucose 169 mg/dL      BUN 16 mg/dL      Creatinine 0.93 mg/dL      Sodium 142 mmol/L      Potassium 3.7 mmol/L      Chloride 104 mmol/L      CO2 26.9 mmol/L      Calcium 9.9 mg/dL      Total Protein 6.7 g/dL      Albumin 4.2 g/dL      ALT (SGPT) 22 U/L      AST (SGOT) 26 U/L      Alkaline Phosphatase 80 U/L      Total Bilirubin 0.3 mg/dL      Globulin 2.5 gm/dL      A/G Ratio 1.7 g/dL      BUN/Creatinine Ratio 17.2     Anion Gap 11.1 mmol/L      eGFR 97.6 mL/min/1.73     Narrative:      GFR Normal >60  Chronic Kidney Disease <60  Kidney Failure <15      Single High Sensitivity Troponin T [[id number]]  (Normal) Collected: 04/29/24 2035    Specimen: Blood Updated: 04/29/24 2100     HS Troponin T 14 ng/L     Narrative:      High Sensitive Troponin T Reference Range:  <14.0 ng/L- Negative Female for AMI  <22.0 ng/L- Negative Male for AMI  >=14 - Abnormal Female indicating possible myocardial injury.  >=22 - Abnormal Male indicating possible myocardial injury.   Clinicians would have to utilize clinical acumen, EKG, Troponin, and serial changes to determine if it is an Acute Myocardial Infarction or myocardial injury due to an underlying chronic condition.         Magnesium [[id number]]  (Normal) Collected: 04/29/24 2035    Specimen: Blood Updated: 04/29/24 2100     Magnesium 1.9 mg/dL     CBC Auto Differential [[id number]]  (Normal) Collected: 04/29/24 2035    Specimen: Blood Updated: 04/29/24 2040     WBC 8.48 10*3/mm3      RBC 5.42 10*6/mm3      Hemoglobin 15.0 g/dL      Hematocrit 47.1 %      MCV 86.9 fL      MCH 27.7 pg      MCHC 31.8 g/dL      RDW 13.6 %      RDW-SD 42.9 fl       MPV 10.8 fL      Platelets 243 10*3/mm3      Neutrophil % 62.1 %      Lymphocyte % 28.9 %      Monocyte % 6.6 %      Eosinophil % 1.1 %      Basophil % 0.9 %      Immature Grans % 0.4 %      Neutrophils, Absolute 5.27 10*3/mm3      Lymphocytes, Absolute 2.45 10*3/mm3      Monocytes, Absolute 0.56 10*3/mm3      Eosinophils, Absolute 0.09 10*3/mm3      Basophils, Absolute 0.08 10*3/mm3      Immature Grans, Absolute 0.03 10*3/mm3      nRBC 0.0 /100 WBC     Urinalysis With Microscopic If Indicated (No Culture) - Urine, Clean Catch [[id number]]  (Abnormal) Collected: 04/29/24 2102    Specimen: Urine, Clean Catch Updated: 04/29/24 2112     Color, UA Yellow     Appearance, UA Clear     pH, UA 6.5     Specific Gravity, UA >1.030     Glucose, UA >=1000 mg/dL (3+)     Ketones, UA 15 mg/dL (1+)     Bilirubin, UA Negative     Blood, UA Small (1+)     Protein, UA Negative     Leuk Esterase, UA Negative     Nitrite, UA Negative     Urobilinogen, UA 1.0 E.U./dL    Urinalysis, Microscopic Only - Urine, Clean Catch [[id number]]  (Abnormal) Collected: 04/29/24 2102    Specimen: Urine, Clean Catch Updated: 04/29/24 2112     RBC, UA 21-50 /HPF      WBC, UA 3-5 /HPF      Bacteria, UA None Seen /HPF      Squamous Epithelial Cells, UA 0-2 /HPF      Hyaline Casts, UA 0-2 /LPF      Methodology Automated Microscopy             Imaging:    CT Head Without Contrast    Result Date: 4/29/2024  CT HEAD WO CONTRAST-  Date of Exam: 4/29/2024 7:41 PM  Indication: Dizziness, persistent/recurrent, cardiac or vascular cause suspected.  Comparison: 4/29/2024  Technique: Axial CT images were obtained of the head without contrast administration.  Reconstructed coronal and sagittal images were also obtained. Automated exposure control and iterative construction methods were used.   Findings: The ventricles have a normal size and configuration. No evidence of acute intracranial hemorrhage, mass or midline shift. Stable benign perivascular cysts in the region  of the basal ganglia. There are no extra-axial fluid collections. No skull fractures are identified. The visualized paranasal sinuses are clear. Stable dehiscence right jugular bulb. Left mastoid effusion. Stable low-lying cerebellar tonsils.      Impression: Stable exam. No acute intracranial abnormalities are identified.    Electronically Signed By-GIOVANA YAÑEZ MD On:4/29/2024 8:46 PM      XR Chest 1 View    Result Date: 4/29/2024  XR CHEST 1 VW-  Date of Exam: 4/29/2024 7:52 PM  Indication: Weak/Dizzy/AMS triage protocol  Comparison: 11/27/2017.  FINDINGS:  The lungs are well-expanded. The heart and pulmonary vasculature are within normal limits. No pleural effusions are identified. There are no active appearing infiltrates.      No active disease. .  Electronically Signed By-GIOVANA YAÑEZ MD On:4/29/2024 8:41 PM         Differential Diagnosis and Discussion:    Dizziness: Based on the patient's history, signs, and symptoms, the diffential diagnosis includes but is not limited to meningitis, stroke, sepsis, subarachnoid hemorrhage, intracranial bleeding, encephalitis, vertigo, electrolyte imbalance, and metabolic disorders.  Headache: Differential diagnosis includes but is not limited to migraine, cluster headache, hypertension, tumor, subarachnoid bleeding, pseudotumor cerebri, temporal arteritis, infections, tension headache, and TMJ syndrome.    All labs were reviewed and interpreted by me.  All X-rays impressions were independently interpreted by me.  EKG was interpreted by me.  CT scan radiology impression was interpreted by me.    MDM  Number of Diagnoses or Management Options  Dizziness  Nonintractable headache, unspecified chronicity pattern, unspecified headache type  Diagnosis management comments: Patient is afebrile nontoxic-appearing.  Vital signs are stable.  At time of evaluation is lying in bed in no acute distress.  Patient does not have any focal deficits on exam.  He is able to ambulate.  Labs  show no significant abnormality.  CT of his head showed no acute finding.  Patient was given medication for headache.  On reevaluation reports improvement in symptoms.  Recommend he follows up with his doctor to discuss MRI.  Discussed return precautions, discharge instructions and answered all his questions.       Amount and/or Complexity of Data Reviewed  Clinical lab tests: reviewed  Tests in the radiology section of CPT®: reviewed  Review and summarize past medical records: yes  Independent visualization of images, tracings, or specimens: yes    Risk of Complications, Morbidity, and/or Mortality  Presenting problems: moderate  Management options: moderate                 Patient Care Considerations:    SEPSIS was considered but is NOT present in the emergency department as SIRS criteria is not present. NARCOTICS: I considered prescribing opiate pain medication as an outpatient, however not indicated for headache management      Consultants/Shared Management Plan:    None    Social Determinants of Health:    Patient is independent, reliable, and has access to care.       Disposition and Care Coordination:    Discharged: The patient is suitable and stable for discharge with no need for consideration of admission.    I have explained the patient´s condition, diagnoses and treatment plan based on the information available to me at this time. I have answered questions and addressed any concerns. The patient has a good  understanding of the patient´s diagnosis, condition, and treatment plan as can be expected at this point. The vital signs have been stable. The patient´s condition is stable and appropriate for discharge from the emergency department.      The patient will pursue further outpatient evaluation with the primary care physician or other designated or consulting physician as outlined in the discharge instructions. They are agreeable to this plan of care and follow-up instructions have been explained in  detail. The patient has received these instructions in written format and has expressed an understanding of the discharge instructions. The patient is aware that any significant change in condition or worsening of symptoms should prompt an immediate return to this or the closest emergency department or call to 911.  I have explained discharge medications and the need for follow up with the patient/caretakers. This was also printed in the discharge instructions. Patient was discharged with the following medications and follow up:      Medication List        New Prescriptions      butalbital-acetaminophen-caffeine -40 MG per tablet  Commonly known as: FIORICET, ESGIC  Take 1 tablet by mouth Every 6 (Six) Hours As Needed for Headache.               Where to Get Your Medications        These medications were sent to [facility] [id number] - [place] - [address] AT [facility] [address] - [phone]  - [phone] FX  [address] [place] [address] [place] [address]      Phone: [phone]   butalbital-acetaminophen-caffeine -40 MG per tablet      Cassandra Cassidy, PAOLO  [address]  [place] [address] [phone]    In 2 days         Final diagnoses:   Nonintractable headache, unspecified chronicity pattern, unspecified headache type   Dizziness        ED Disposition       ED Disposition   Discharge    Condition   Stable    Comment   --               This medical record created using voice recognition software.             Elder Toure MD  04/30/24 6210

## 2024-04-30 VITALS
BODY MASS INDEX: 34.04 KG/M2 | HEART RATE: 84 BPM | HEIGHT: 72 IN | WEIGHT: 251.32 LBS | DIASTOLIC BLOOD PRESSURE: 88 MMHG | TEMPERATURE: 98.5 F | SYSTOLIC BLOOD PRESSURE: 141 MMHG | RESPIRATION RATE: 18 BRPM | OXYGEN SATURATION: 93 %

## 2024-04-30 LAB
QT INTERVAL: 376 MS
QTC INTERVAL: 448 MS

## 2024-04-30 RX ORDER — BUTALBITAL, ACETAMINOPHEN AND CAFFEINE 50; 325; 40 MG/1; MG/1; MG/1
1 TABLET ORAL EVERY 6 HOURS PRN
Qty: 8 TABLET | Refills: 0 | Status: SHIPPED | OUTPATIENT
Start: 2024-04-30

## 2024-05-15 ENCOUNTER — TELEPHONE (OUTPATIENT)
Dept: SURGERY | Facility: CLINIC | Age: 54
End: 2024-05-15
Payer: COMMERCIAL

## 2024-05-15 NOTE — TELEPHONE ENCOUNTER
I have called and spoke with the patient. I did inform the patient that he is with [employer] fast[employer]. Patient verbalized understanding.

## 2024-05-15 NOTE — TELEPHONE ENCOUNTER
Hub staff attempted to follow warm transfer process and was unsuccessful     Caller: Rupert Briscoe    Relationship to patient: Self    Best call back number: [phone]    Patient is needing: TO KNOW WHAT KIND OF MESS WAS USED IN HIS SURG W/ DR SOUSA 03/22/22. IS NEEDING TO KNOW ASAP DUE TO MRI JARROD FOR 11AM TODAY.

## 2024-06-11 NOTE — PROGRESS NOTES
Chief Complaint  Diabetes (Follow up, med mgt, CGM eval, A1c eval patient sensor has run out unable to get more for about 10-14 days. )    Referred By: PAOLO Mae presents to [facility] for diabetes medication management    History of Present Illness    Visit type:  follow-up  Diabetes type:  Type 2  Current diabetes status/concerns/issues:  Reports he has not been checking his blood sugar since his CGM fell off. States he hsa not been checking his glucose with his meter since being off his CGM. States he loves the jonathan to monitor his glucose. States if his blood sugar is high he will avoid eating and if it alerts him of a low he will eat something to bring it up.   Other health concerns: No new health concerns  Current Diabetes symptoms:    Polyuria: No   Polydipsia: No   Polyphagia: Yes     Blurred vision: No   Excessive fatigue: Yes    Known Diabetes complications:  Neuropathy: Numbness and Tingling; Location: Feet  Renal: None  Eyes: No current eye exam available in record; Location: N/A; Last Eye Exam:  over a year ago ; Location:   Amputation/Wounds: None  GI: Constipation, Diarrhea, Reflux, and Indigestion  Cardiovascular: Hypertension and Hyperlipidemia  ED: Patient Reported  Other: None  Hypoglycemia:  None reported at this time and 0% per CGM  Hypoglycemia Symptoms:  No hypoglycemia at this time  Current diabetes treatment:  metformin 500mg bid, amaryl 2mg qd, farxiga 10mg qd   Blood glucose device:  Jonathan CGM  Blood glucose monitoring frequency:  Continuous per CGM  Blood glucose range/average:  The 14-day sensor report shows an average glucose of 155 mg/dL, with 84% in target range ( mgdL), 16% in the high range (181-250 mg/dL), 0% in the very high range (>250 mg/dL), 0% in the low range (54-70 mg/dL) and 0% in the very low range (<54 mg/dL).   Glucose Source: Device Reviewed  Dietary behavior:  Limits  high carb/sweet foods, Avoids sugary drinks, Number of meals each day - 3; Number of snacks each day - 2. States he is now eating in moderation  Activity/Exercise:  None    Past Medical History:   Diagnosis Date    Allergic     Anxiety     Colon polyp     Depression     Diabetes mellitus     DOESN'T CHECK BG AT HOME    Diverticulosis     Hernia of abdominal wall     Hyperlipidemia     Hypertension     Migraine     Pancreatitis     Prostatitis     Shortness of breath     DENIED ANY CURRENTLY    Sinusitis     Umbilical hernia     Urinary tract infection     NO CURRENT PROBLEMS     Past Surgical History:   Procedure Laterality Date    APPENDECTOMY      CHOLECYSTECTOMY      COLON SURGERY      R/T DIVERTICULITIS    HEMORRHOIDECTOMY      HERNIA REPAIR      X2    MANDIBLE FRACTURE SURGERY      TONSILLECTOMY      UMBILICAL HERNIA REPAIR N/A 4/20/2022    Procedure: UMBILICAL HERNIA REPAIR LAPAROSCOPIC;  Surgeon: Tad Ortiz MD;  Location: [facility] MAIN OR;  Service: General;  Laterality: N/A;     Family History   Adopted: Yes   Family history unknown: Yes     Social History     Socioeconomic History    Marital status:    Tobacco Use    Smoking status: Every Day     Current packs/day: 0.25     Types: Cigarettes    Smokeless tobacco: Never    Tobacco comments:     'quit last year' - smoked 30+ years - INSTRUCTED NO SMOKING 24 HR PRIOR TO SURGERY   Vaping Use    Vaping status: Every Day    Substances: Nicotine    Devices: Disposable   Substance and Sexual Activity    Alcohol use: Not Currently     Comment: soc     Drug use: No    Sexual activity: Yes     Partners: Female     Birth control/protection: None     No Known Allergies    Current Outpatient Medications:     Accu-Chek Crystal Plus test strip, Check glucose daily in the morning., Disp: 100 each, Rfl: 5    atorvastatin (LIPITOR) 20 MG tablet, Take 1 tablet by mouth Daily., Disp: , Rfl:     Blood Glucose Monitoring Suppl (ACCU-CHEK CRYSTAL PLUS) w/Device kit, Check  glucose daily in the morning., Disp: 1 kit, Rfl: 0    Blood Glucose Monitoring Suppl device, Use as directed for blood glucose monitoring, Disp: 1 each, Rfl: 0    Blood Glucose Monitoring Suppl device, Use as directed for blood glucose monitoring, Disp: 1 each, Rfl: 0    busPIRone (BUSPAR) 10 MG tablet, Take 1 tablet by mouth 3 (Three) Times a Day., Disp: , Rfl:     cholecalciferol (VITAMIN D3) 25 MCG (1000 UT) tablet, Take 2 tablets by mouth Daily., Disp: , Rfl:     Continuous Glucose Sensor (FreeStyle Jonathan 3 Sensor) misc, Use 1 each Every 14 (Fourteen) Days., Disp: 1 each, Rfl: 0    dapagliflozin Propanediol 10 MG tablet, Take 10 mg by mouth Daily for 180 days., Disp: 90 tablet, Rfl: 1    glimepiride (AMARYL) 2 MG tablet, Take 2 tablets by mouth Every Morning Before Breakfast for 180 days., Disp: 180 tablet, Rfl: 1    glucose blood test strip, Test blood glucose 1 times each day, Disp: 100 each, Rfl: 5    glucose blood test strip, Test blood glucose 1 times each day, Disp: 100 each, Rfl: 5    Lancets (accu-chek multiclix) lancets, Check glucose daily in the morning., Disp: 100 each, Rfl: 5    Lancets misc, Test blood glucose 1 times each day, Disp: 100 each, Rfl: 5    Lancets misc, Test blood glucose 1 times each day, Disp: 100 each, Rfl: 5    levocetirizine (XYZAL) 5 MG tablet, Take 1 tablet by mouth Daily., Disp: , Rfl:     lisinopril (PRINIVIL,ZESTRIL) 5 MG tablet, Take 1 tablet by mouth Daily., Disp: , Rfl:     metFORMIN (GLUCOPHAGE) 500 MG tablet, Take 1 tablet by mouth 2 (Two) Times a Day With Meals for 180 days., Disp: 180 tablet, Rfl: 1    OXcarbazepine (TRILEPTAL) 300 MG tablet, Take 1 tablet by mouth 2 (Two) Times a Day., Disp: , Rfl:     vitamin D (ERGOCALCIFEROL) 1.25 MG (43953 UT) capsule capsule, , Disp: , Rfl:     Vraylar 3 MG capsule capsule, Take 1 capsule by mouth Daily., Disp: , Rfl:     butalbital-acetaminophen-caffeine (FIORICET, ESGIC) -40 MG per tablet, Take 1 tablet by mouth Every 6  "(Six) Hours As Needed for Headache. (Patient not taking: Reported on 6/12/2024), Disp: 8 tablet, Rfl: 0    omeprazole (priLOSEC) 40 MG capsule, Take 1 capsule by mouth Daily. (Patient not taking: Reported on 6/12/2024), Disp: , Rfl:     Objective     Vitals:    06/12/24 1525   BP: 113/66   Pulse: 89   SpO2: 96%   Weight: 109 kg (240 lb)   Height: 182.9 cm (72\")   PainSc: 0-No pain     Body mass index is 32.55 kg/m².    Physical Exam  Constitutional:       Appearance: Normal appearance. He is obese.      Comments: Obesity (BMI 30 - 39.9) Pt Current BMI = 32.55     HENT:      Head: Normocephalic and atraumatic.      Right Ear: External ear normal.      Left Ear: External ear normal.      Nose: Nose normal.   Eyes:      Extraocular Movements: Extraocular movements intact.      Conjunctiva/sclera: Conjunctivae normal.   Pulmonary:      Effort: Pulmonary effort is normal.   Musculoskeletal:         General: Normal range of motion.      Cervical back: Normal range of motion.   Skin:     General: Skin is warm and dry.   Neurological:      General: No focal deficit present.      Mental Status: He is alert and oriented to person, place, and time. Mental status is at baseline.   Psychiatric:         Mood and Affect: Mood normal.         Behavior: Behavior normal.         Thought Content: Thought content normal.         Judgment: Judgment normal.             Result Review :   The following data was reviewed by: PAOLO Tovar on 06/12/2024:    Most Recent A1C          6/12/2024    15:33   HGBA1C Most Recent   Hemoglobin A1C 7.2        A1C Last 3 Results          3/21/2024    09:09 6/12/2024    15:33   HGBA1C Last 3 Results   Hemoglobin A1C 8.6  7.2      A1c collected in the office today is 7.2%, indicating Uncontrolled Type II diabetes.  This result is down from the prior result of 8.6% collected on 3/21/24.     Glucose   Date Value Ref Range Status   06/12/2024 158 (H) 70 - 99 mg/dL Final     Comment:     Serial " Number: [id number]:  [id number]     Point of care glucose in the office today is within normal limits for nonfasting glucose    Creatinine   Date Value Ref Range Status   04/29/2024 0.93 0.76 - 1.27 mg/dL Final   04/20/2022 1.11 0.76 - 1.27 mg/dL Final   04/12/2022 0.99 0.73 - 1.18 mg/dL Final   12/02/2021 1.16 0.73 - 1.18 mg/dL Final     eGFR   Date Value Ref Range Status   04/29/2024 97.6 >60.0 mL/min/1.73 Final   04/20/2022 79.9 >60.0 mL/min/1.73 Final     Comment:     National Kidney Foundation and American Society of Nephrology (ASN) Task Force recommended calculation based on the Chronic Kidney Disease Epidemiology Collaboration (CKD-EPI) equation refit without adjustment for race.     Labs collected on 4/29/24 show Normal values                    Assessment: Patient is here today for 2-month follow-up on his diabetes.Reports he has not been checking his blood sugar since his CGM fell off. States he hsa not been checking his glucose with his meter since being off his CGM. States he loves the jonathan as it helps him closely monitor his glucose . States if his CGM alerts him of a high glucose he will avoid eating and if it alerts him of a low he will eat something to bring it up.  Reviewed CGM report with patient in the office today.The 14-day sensor report shows an average glucose of 155 mg/dL, with 84% in target range ( mgdL), 16% in the high range (181-250 mg/dL), 0% in the very high range (>250 mg/dL), 0% in the low range (54-70 mg/dL) and 0% in the very low range (<54 mg/dL).  His A1c in the office today 7.2% which is down from his previous A1c of 8.6% in March.      Diagnoses and all orders for this visit:    1. Type 2 diabetes mellitus with hyperglycemia, without long-term current use of insulin (Primary)  -     POC Glycosylated Hemoglobin (Hb A1C)  -     Continuous Glucose Sensor (FreeStyle Jonathan 3 Sensor) misc; Use 1 each Every 14 (Fourteen) Days.  Dispense: 1 each; Refill: 0    2. High  cholesterol  -     Lipid Panel; Future    3. Uncontrolled type 2 diabetes mellitus with hyperglycemia    4. Type 2 diabetes mellitus with diabetic neuropathy, without long-term current use of insulin    5. Uses self-applied continuous glucose monitoring device    6. Obesity (BMI 30-39.9)    Other orders  -     POC Glucose        Plan: No changes were made to his plan of care today since his glucose levels are trending down.  He is due a lipid which is ordered at his visit today.  A sample myles 3 was applied to the right posterior arm.  Scheduled a 3-month follow-up and we will review his CGM and recheck his A1c at that time.    The patient will monitor his blood glucose levels per CGM.  If he develops problematic hyperglycemia or hypoglycemia or adverse drug reactions, he will contact the office for further instructions.        Follow Up     Return in about 3 months (around 9/12/2024) for CGM Follow Up, Medication Mgmt.    Patient was given instructions and counseling regarding his condition or for health maintenance advice. Please see specific information pulled into the AVS if appropriate.     Naya Ca, PAOLO  06/12/2024      Dictated Utilizing Dragon Dictation.  Please note that portions of this note were completed with a voice recognition program.  Part of this note may be an electronic transcription/translation of spoken language to printed text using the Dragon Dictation System.

## 2024-06-12 ENCOUNTER — OFFICE VISIT (OUTPATIENT)
Dept: DIABETES SERVICES | Facility: HOSPITAL | Age: 54
End: 2024-06-12
Payer: COMMERCIAL

## 2024-06-12 VITALS
HEIGHT: 72 IN | WEIGHT: 240 LBS | DIASTOLIC BLOOD PRESSURE: 66 MMHG | BODY MASS INDEX: 32.51 KG/M2 | HEART RATE: 89 BPM | OXYGEN SATURATION: 96 % | SYSTOLIC BLOOD PRESSURE: 113 MMHG

## 2024-06-12 DIAGNOSIS — E11.65 UNCONTROLLED TYPE 2 DIABETES MELLITUS WITH HYPERGLYCEMIA: ICD-10-CM

## 2024-06-12 DIAGNOSIS — Z97.8 USES SELF-APPLIED CONTINUOUS GLUCOSE MONITORING DEVICE: ICD-10-CM

## 2024-06-12 DIAGNOSIS — E66.9 OBESITY (BMI 30-39.9): ICD-10-CM

## 2024-06-12 DIAGNOSIS — E11.65 TYPE 2 DIABETES MELLITUS WITH HYPERGLYCEMIA, WITHOUT LONG-TERM CURRENT USE OF INSULIN: Primary | ICD-10-CM

## 2024-06-12 DIAGNOSIS — E78.00 HIGH CHOLESTEROL: ICD-10-CM

## 2024-06-12 DIAGNOSIS — E11.40 TYPE 2 DIABETES MELLITUS WITH DIABETIC NEUROPATHY, WITHOUT LONG-TERM CURRENT USE OF INSULIN: ICD-10-CM

## 2024-06-12 LAB
EXPIRATION DATE: ABNORMAL
GLUCOSE BLDC GLUCOMTR-MCNC: 158 MG/DL (ref 70–99)
HBA1C MFR BLD: 7.2 % (ref 4.5–5.7)
Lab: ABNORMAL

## 2024-06-12 PROCEDURE — 1160F RVW MEDS BY RX/DR IN RCRD: CPT | Performed by: NURSE PRACTITIONER

## 2024-06-12 PROCEDURE — G0463 HOSPITAL OUTPT CLINIC VISIT: HCPCS | Performed by: NURSE PRACTITIONER

## 2024-06-12 PROCEDURE — 3078F DIAST BP <80 MM HG: CPT | Performed by: NURSE PRACTITIONER

## 2024-06-12 PROCEDURE — 3074F SYST BP LT 130 MM HG: CPT | Performed by: NURSE PRACTITIONER

## 2024-06-12 PROCEDURE — 95251 CONT GLUC MNTR ANALYSIS I&R: CPT | Performed by: NURSE PRACTITIONER

## 2024-06-12 PROCEDURE — 83036 HEMOGLOBIN GLYCOSYLATED A1C: CPT | Performed by: NURSE PRACTITIONER

## 2024-06-12 PROCEDURE — 1159F MED LIST DOCD IN RCRD: CPT | Performed by: NURSE PRACTITIONER

## 2024-06-12 PROCEDURE — 82948 REAGENT STRIP/BLOOD GLUCOSE: CPT | Performed by: NURSE PRACTITIONER

## 2024-06-12 PROCEDURE — 3051F HG A1C>EQUAL 7.0%<8.0%: CPT | Performed by: NURSE PRACTITIONER

## 2024-06-12 PROCEDURE — 99213 OFFICE O/P EST LOW 20 MIN: CPT | Performed by: NURSE PRACTITIONER

## 2024-06-12 RX ORDER — BLOOD-GLUCOSE SENSOR
1 EACH MISCELLANEOUS
Qty: 1 EACH | Refills: 0 | COMMUNITY
Start: 2024-06-12

## 2024-08-25 ENCOUNTER — HOSPITAL ENCOUNTER (EMERGENCY)
Facility: HOSPITAL | Age: 54
Discharge: HOME | End: 2024-08-25
Attending: EMERGENCY MEDICINE | Admitting: EMERGENCY MEDICINE
Payer: COMMERCIAL

## 2024-08-25 ENCOUNTER — APPOINTMENT (OUTPATIENT)
Dept: GENERAL RADIOLOGY | Facility: HOSPITAL | Age: 54
End: 2024-08-25
Payer: COMMERCIAL

## 2024-08-25 VITALS
SYSTOLIC BLOOD PRESSURE: 123 MMHG | HEART RATE: 75 BPM | RESPIRATION RATE: 16 BRPM | OXYGEN SATURATION: 96 % | DIASTOLIC BLOOD PRESSURE: 73 MMHG | HEIGHT: 72 IN | TEMPERATURE: 98.6 F | WEIGHT: 249.56 LBS | BODY MASS INDEX: 33.8 KG/M2

## 2024-08-25 DIAGNOSIS — R09.1 PLEURISY: Primary | ICD-10-CM

## 2024-08-25 LAB
ALBUMIN SERPL-MCNC: 3.9 G/DL (ref 3.5–5.2)
ALBUMIN/GLOB SERPL: 1.4 G/DL
ALP SERPL-CCNC: 87 U/L (ref 39–117)
ALT SERPL W P-5'-P-CCNC: 15 U/L (ref 1–41)
ANION GAP SERPL CALCULATED.3IONS-SCNC: 10.9 MMOL/L (ref 5–15)
AST SERPL-CCNC: 12 U/L (ref 1–40)
BASOPHILS # BLD AUTO: 0.14 10*3/MM3 (ref 0–0.2)
BASOPHILS NFR BLD AUTO: 1.7 % (ref 0–1.5)
BILIRUB SERPL-MCNC: 0.3 MG/DL (ref 0–1.2)
BUN SERPL-MCNC: 19 MG/DL (ref 6–20)
BUN/CREAT SERPL: 16.5 (ref 7–25)
CALCIUM SPEC-SCNC: 9.7 MG/DL (ref 8.6–10.5)
CHLORIDE SERPL-SCNC: 102 MMOL/L (ref 98–107)
CO2 SERPL-SCNC: 28.1 MMOL/L (ref 22–29)
CREAT SERPL-MCNC: 1.15 MG/DL (ref 0.76–1.27)
DEPRECATED RDW RBC AUTO: 42.3 FL (ref 37–54)
EGFRCR SERPLBLD CKD-EPI 2021: 75.6 ML/MIN/1.73
EOSINOPHIL # BLD AUTO: 0.24 10*3/MM3 (ref 0–0.4)
EOSINOPHIL NFR BLD AUTO: 2.9 % (ref 0.3–6.2)
ERYTHROCYTE [DISTWIDTH] IN BLOOD BY AUTOMATED COUNT: 13.5 % (ref 12.3–15.4)
GEN 5 2HR TROPONIN T REFLEX: 6 NG/L
GLOBULIN UR ELPH-MCNC: 2.7 GM/DL
GLUCOSE SERPL-MCNC: 202 MG/DL (ref 65–99)
HCT VFR BLD AUTO: 43.3 % (ref 37.5–51)
HGB BLD-MCNC: 14.2 G/DL (ref 13–17.7)
HOLD SPECIMEN: NORMAL
HOLD SPECIMEN: NORMAL
IMM GRANULOCYTES # BLD AUTO: 0.02 10*3/MM3 (ref 0–0.05)
IMM GRANULOCYTES NFR BLD AUTO: 0.2 % (ref 0–0.5)
LIPASE SERPL-CCNC: 68 U/L (ref 13–60)
LYMPHOCYTES # BLD AUTO: 2.57 10*3/MM3 (ref 0.7–3.1)
LYMPHOCYTES NFR BLD AUTO: 31.3 % (ref 19.6–45.3)
MAGNESIUM SERPL-MCNC: 2 MG/DL (ref 1.6–2.6)
MCH RBC QN AUTO: 28.1 PG (ref 26.6–33)
MCHC RBC AUTO-ENTMCNC: 32.8 G/DL (ref 31.5–35.7)
MCV RBC AUTO: 85.6 FL (ref 79–97)
MONOCYTES # BLD AUTO: 0.59 10*3/MM3 (ref 0.1–0.9)
MONOCYTES NFR BLD AUTO: 7.2 % (ref 5–12)
NEUTROPHILS NFR BLD AUTO: 4.64 10*3/MM3 (ref 1.7–7)
NEUTROPHILS NFR BLD AUTO: 56.7 % (ref 42.7–76)
NRBC BLD AUTO-RTO: 0 /100 WBC (ref 0–0.2)
NT-PROBNP SERPL-MCNC: 45.4 PG/ML (ref 0–900)
PLATELET # BLD AUTO: 284 10*3/MM3 (ref 140–450)
PMV BLD AUTO: 10.5 FL (ref 6–12)
POTASSIUM SERPL-SCNC: 3.8 MMOL/L (ref 3.5–5.2)
PROT SERPL-MCNC: 6.6 G/DL (ref 6–8.5)
QT INTERVAL: 360 MS
QT INTERVAL: 370 MS
QTC INTERVAL: 416 MS
QTC INTERVAL: 439 MS
RBC # BLD AUTO: 5.06 10*6/MM3 (ref 4.14–5.8)
SODIUM SERPL-SCNC: 141 MMOL/L (ref 136–145)
TROPONIN T DELTA: 0 NG/L
TROPONIN T SERPL HS-MCNC: 6 NG/L
WBC NRBC COR # BLD AUTO: 8.2 10*3/MM3 (ref 3.4–10.8)
WHOLE BLOOD HOLD COAG: NORMAL
WHOLE BLOOD HOLD SPECIMEN: NORMAL

## 2024-08-25 PROCEDURE — 83735 ASSAY OF MAGNESIUM: CPT

## 2024-08-25 PROCEDURE — 80053 COMPREHEN METABOLIC PANEL: CPT

## 2024-08-25 PROCEDURE — 83880 ASSAY OF NATRIURETIC PEPTIDE: CPT

## 2024-08-25 PROCEDURE — 84484 ASSAY OF TROPONIN QUANT: CPT

## 2024-08-25 PROCEDURE — 36415 COLL VENOUS BLD VENIPUNCTURE: CPT

## 2024-08-25 PROCEDURE — 99284 EMERGENCY DEPT VISIT MOD MDM: CPT

## 2024-08-25 PROCEDURE — 93005 ELECTROCARDIOGRAM TRACING: CPT

## 2024-08-25 PROCEDURE — 85025 COMPLETE CBC W/AUTO DIFF WBC: CPT

## 2024-08-25 PROCEDURE — 93005 ELECTROCARDIOGRAM TRACING: CPT | Performed by: EMERGENCY MEDICINE

## 2024-08-25 PROCEDURE — 83690 ASSAY OF LIPASE: CPT

## 2024-08-25 PROCEDURE — 96374 THER/PROPH/DIAG INJ IV PUSH: CPT

## 2024-08-25 PROCEDURE — 84484 ASSAY OF TROPONIN QUANT: CPT | Performed by: EMERGENCY MEDICINE

## 2024-08-25 PROCEDURE — 71045 X-RAY EXAM CHEST 1 VIEW: CPT

## 2024-08-25 PROCEDURE — 25010000002 KETOROLAC TROMETHAMINE PER 15 MG: Performed by: EMERGENCY MEDICINE

## 2024-08-25 RX ORDER — ASPIRIN 81 MG/1
324 TABLET, CHEWABLE ORAL ONCE
Status: DISCONTINUED | OUTPATIENT
Start: 2024-08-25 | End: 2024-08-26 | Stop reason: HOSPADM

## 2024-08-25 RX ORDER — KETOROLAC TROMETHAMINE 10 MG/1
10 TABLET, FILM COATED ORAL EVERY 6 HOURS PRN
Qty: 15 TABLET | Refills: 0 | Status: SHIPPED | OUTPATIENT
Start: 2024-08-25

## 2024-08-25 RX ORDER — KETOROLAC TROMETHAMINE 30 MG/ML
30 INJECTION, SOLUTION INTRAMUSCULAR; INTRAVENOUS ONCE
Status: COMPLETED | OUTPATIENT
Start: 2024-08-25 | End: 2024-08-25

## 2024-08-25 RX ORDER — SODIUM CHLORIDE 0.9 % (FLUSH) 0.9 %
10 SYRINGE (ML) INJECTION AS NEEDED
Status: DISCONTINUED | OUTPATIENT
Start: 2024-08-25 | End: 2024-08-26 | Stop reason: HOSPADM

## 2024-08-25 RX ADMIN — KETOROLAC TROMETHAMINE 30 MG: 30 INJECTION, SOLUTION INTRAMUSCULAR; INTRAVENOUS at 20:07

## 2024-08-25 NOTE — Clinical Note
[facility] EMERGENCY ROOM  [address] [place] [address]  Phone: [phone]  Fax: [phone]    Rupert Briscoe was seen and treated in our emergency department on 8/25/2024.  He may return to work on 08/28/2024.         Thank you for choosing [facility].    Camila Patel RN

## 2024-08-26 NOTE — ED PROVIDER NOTES
Time: 9:28 PM EDT  Date of encounter:  8/25/2024  Independent Historian/Clinical History and Information was obtained by:   Patient    History is limited by: N/A    Chief Complaint: Chest pain      History of Present Illness:  Patient is a 53 y.o. year old male who presents to the emergency department for evaluation of chest pain.  The patient reports that his chest feels tight.  Patient denies shortness of breath.  Patient has no nausea or vomiting.  Patient has no diarrhea.  Patient denies leg pain and swelling.  Patient has no fever or chills.  Patient denies cough and hemoptysis.      Patient Care Team  Primary Care Provider: Cassandra Cassidy APRN    Past Medical History:     No Known Allergies  Past Medical History:   Diagnosis Date    Allergic     Anxiety     Colon polyp     Depression     Diabetes mellitus     DOESN'T CHECK BG AT HOME    Diverticulosis     Hernia of abdominal wall     Hyperlipidemia     Hypertension     Migraine     Pancreatitis     Prostatitis     Shortness of breath     DENIED ANY CURRENTLY    Sinusitis     Umbilical hernia     Urinary tract infection     NO CURRENT PROBLEMS     Past Surgical History:   Procedure Laterality Date    APPENDECTOMY      CHOLECYSTECTOMY      COLON SURGERY      R/T DIVERTICULITIS    HEMORRHOIDECTOMY      HERNIA REPAIR      X2    MANDIBLE FRACTURE SURGERY      TONSILLECTOMY      UMBILICAL HERNIA REPAIR N/A 4/20/2022    Procedure: UMBILICAL HERNIA REPAIR LAPAROSCOPIC;  Surgeon: Tad Ortiz MD;  Location: [facility] MAIN OR;  Service: General;  Laterality: N/A;     Family History   Adopted: Yes   Family history unknown: Yes       Home Medications:  Prior to Admission medications    Medication Sig Start Date End Date Taking? Authorizing Provider   Accu-Chek Crystal Plus test strip Check glucose daily in the morning. 3/21/24   Naya Ca APRN   atorvastatin (LIPITOR) 20 MG tablet Take 1 tablet by mouth Daily. 5/15/23   Provider, MD Odessa   Blood  Glucose Monitoring Suppl (ACCU-CHEK SONU PLUS) w/Device kit Check glucose daily in the morning. 5/30/19   Evan Dumont DO   Blood Glucose Monitoring Suppl device Use as directed for blood glucose monitoring 3/21/24   Naya Ca APRN   Blood Glucose Monitoring Suppl device Use as directed for blood glucose monitoring 3/25/24   Naya Ca APRN   busPIRone (BUSPAR) 10 MG tablet Take 1 tablet by mouth 3 (Three) Times a Day. 5/6/23   ProviderOdessa MD   butalbital-acetaminophen-caffeine (FIORICET, ESGIC) -40 MG per tablet Take 1 tablet by mouth Every 6 (Six) Hours As Needed for Headache.  Patient not taking: Reported on 6/12/2024 4/30/24   Elder Toure MD   cholecalciferol (VITAMIN D3) 25 MCG (1000 UT) tablet Take 2 tablets by mouth Daily. 4/15/22   Provider, MD [place]   Continuous Glucose Sensor (FreeStyle Jonathan 3 Sensor) [facility] Use 1 each Every 14 (Fourteen) Days. 6/12/24   Naya Ca APRN   dapagliflozin Propanediol 10 MG tablet Take 10 mg by mouth Daily for 180 days. 4/10/24 10/7/24  Naya Ca APRN   dexAMETHasone (DECADRON) 6 MG tablet Take 1 tablet by mouth Daily With Breakfast. 8/11/24   Ancelmo Murray MD   glimepiride (AMARYL) 2 MG tablet Take 2 tablets by mouth Every Morning Before Breakfast for 180 days. 4/10/24 10/7/24  Naya Ca APRN   glucose blood test strip Test blood glucose 1 times each day 3/21/24   Naya Ca APRN   glucose blood test strip Test blood glucose 1 times each day 3/25/24   Naya Ca APRN   ketorolac (TORADOL) 10 MG tablet Take 1 tablet by mouth Every 6 (Six) Hours As Needed for Moderate Pain. 8/25/24   Faby Dominguez MD   Lancets (accu-chek multiclix) lancets Check glucose daily in the morning. 3/21/24   Naya Ca APRN   Lancets misc Test blood glucose 1 times each day 3/21/24   Naya Ca APRN   Lancets misc Test blood glucose  1 times each day 3/25/24   Naya Ca APRN   levocetirizine (XYZAL) 5 MG tablet Take 1 tablet by mouth Daily. 11/1/23   [place]essa Graves MD   lisinopril (PRINIVIL,ZESTRIL) 5 MG tablet Take 1 tablet by mouth Daily. 3/8/23   [place]essa Graves MD   metFORMIN (GLUCOPHAGE) 500 MG tablet Take 1 tablet by mouth 2 (Two) Times a Day With Meals for 180 days. 4/10/24 10/7/24  Naya Ca APRN   omeprazole (priLOSEC) 40 MG capsule Take 1 capsule by mouth Daily.  Patient not taking: Reported on 6/12/2024 3/8/23   [place] Graves MD   OXcarbazepine (TRILEPTAL) 300 MG tablet Take 1 tablet by mouth 2 (Two) Times a Day. 2/28/22   Odessa Graves MD   vitamin D (ERGOCALCIFEROL) 1.25 MG (55637 UT) capsule capsule  5/15/23   Odessa Graves MD   Vraylar 3 MG capsule capsule Take 1 capsule by mouth Daily. 1/29/24   Odessa Gravse MD        Social History:   Social History     Tobacco Use    Smoking status: Former     Current packs/day: 0.25     Types: Cigarettes     Passive exposure: Never    Smokeless tobacco: Never    Tobacco comments:     'quit last year' - smoked 30+ years - INSTRUCTED NO SMOKING 24 HR PRIOR TO SURGERY   Vaping Use    Vaping status: Every Day    Substances: Nicotine    Devices: Disposable   Substance Use Topics    Alcohol use: Not Currently     Comment: soc     Drug use: No         Review of Systems:  Review of Systems   Constitutional:  Negative for chills and fever.   HENT:  Negative for congestion, rhinorrhea and sore throat.    Eyes:  Negative for pain and visual disturbance.   Respiratory:  Negative for apnea, cough, chest tightness and shortness of breath.    Cardiovascular:  Positive for chest pain. Negative for palpitations.   Gastrointestinal:  Negative for abdominal pain, diarrhea, nausea and vomiting.   Genitourinary:  Negative for difficulty urinating and dysuria.   Musculoskeletal:  Negative for joint swelling and myalgias.   Skin:  Negative for  "color change.   Neurological:  Negative for seizures and headaches.   Psychiatric/Behavioral: Negative.     All other systems reviewed and are negative.       Physical Exam:  /73   Pulse 75   Temp 98.6 °F (37 °C) (Oral)   Resp 16   Ht 182.9 cm (72\")   Wt 113 kg (249 lb 9 oz)   SpO2 96%   BMI 33.85 kg/m²     Physical Exam  Vitals and nursing note reviewed.   Constitutional:       General: He is not in acute distress.     Appearance: Normal appearance. He is not toxic-appearing.   HENT:      Head: Normocephalic and atraumatic.      Jaw: There is normal jaw occlusion.   Eyes:      General: Lids are normal.      Extraocular Movements: Extraocular movements intact.      Conjunctiva/sclera: Conjunctivae normal.      Pupils: Pupils are equal, round, and reactive to light.   Cardiovascular:      Rate and Rhythm: Normal rate and regular rhythm.      Pulses: Normal pulses.      Heart sounds: Normal heart sounds.   Pulmonary:      Effort: Pulmonary effort is normal. No respiratory distress.      Breath sounds: Normal breath sounds. No wheezing or rhonchi.   Abdominal:      General: Abdomen is flat.      Palpations: Abdomen is soft.      Tenderness: There is no abdominal tenderness. There is no guarding or rebound.   Musculoskeletal:         General: Normal range of motion.      Cervical back: Normal range of motion and neck supple.      Right lower leg: No edema.      Left lower leg: No edema.   Skin:     General: Skin is warm and dry.   Neurological:      Mental Status: He is alert and oriented to person, place, and time. Mental status is at baseline.   Psychiatric:         Mood and Affect: Mood normal.                  Procedures:  Procedures      Medical Decision Making:      Comorbidities that affect care:    Diabetes, Hypertension    External Notes reviewed:    Previous ED Note: Patient was last seen for headache.      The following orders were placed and all results were independently analyzed by me:  Orders " Placed This Encounter   Procedures    XR Chest 1 View    [place] Draw    High Sensitivity Troponin T    Comprehensive Metabolic Panel    Lipase    BNP    Magnesium    CBC Auto Differential    High Sensitivity Troponin T 2Hr    NPO Diet NPO Type: Strict NPO    Undress & Gown    Continuous Pulse Oximetry    Oxygen Therapy- Nasal Cannula; Titrate 1-6 LPM Per SpO2; 90 - 95%    ECG 12 Lead ED Triage Standing Order; Chest Pain    ECG 12 Lead ED Triage Standing Order; Chest Pain    Insert Peripheral IV    CBC & Differential    Green Top (Gel)    Lavender Top    Gold Top - SST    Light Blue Top       Medications Given in the Emergency Department:  Medications   sodium chloride 0.9 % flush 10 mL (has no administration in time range)   aspirin chewable tablet 324 mg (has no administration in time range)   ketorolac (TORADOL) injection 30 mg (30 mg Intravenous Given 8/25/24 2007)        ED Course:         Labs:    Lab Results (last 24 hours)       Procedure Component Value Units Date/Time    High Sensitivity Troponin T [[id number]]  (Normal) Collected: 08/25/24 1826    Specimen: Blood Updated: 08/25/24 1908     HS Troponin T 6 ng/L     Narrative:      High Sensitive Troponin T Reference Range:  <14.0 ng/L- Negative Female for AMI  <22.0 ng/L- Negative Male for AMI  >=14 - Abnormal Female indicating possible myocardial injury.  >=22 - Abnormal Male indicating possible myocardial injury.   Clinicians would have to utilize clinical acumen, EKG, Troponin, and serial changes to determine if it is an Acute Myocardial Infarction or myocardial injury due to an underlying chronic condition.         CBC & Differential [[id number]]  (Abnormal) Collected: 08/25/24 1826    Specimen: Blood Updated: 08/25/24 1841    Narrative:      The following orders were created for panel order CBC & Differential.  Procedure                               Abnormality         Status                     ---------                               -----------          ------                     CBC Auto Differential[[id number]]        Abnormal            Final result                 Please view results for these tests on the individual orders.    Comprehensive Metabolic Panel [[id number]]  (Abnormal) Collected: 08/25/24 1826    Specimen: Blood Updated: 08/25/24 1908     Glucose 202 mg/dL      BUN 19 mg/dL      Creatinine 1.15 mg/dL      Sodium 141 mmol/L      Potassium 3.8 mmol/L      Chloride 102 mmol/L      CO2 28.1 mmol/L      Calcium 9.7 mg/dL      Total Protein 6.6 g/dL      Albumin 3.9 g/dL      ALT (SGPT) 15 U/L      AST (SGOT) 12 U/L      Alkaline Phosphatase 87 U/L      Total Bilirubin 0.3 mg/dL      Globulin 2.7 gm/dL      A/G Ratio 1.4 g/dL      BUN/Creatinine Ratio 16.5     Anion Gap 10.9 mmol/L      eGFR 75.6 mL/min/1.73     Narrative:      GFR Normal >60  Chronic Kidney Disease <60  Kidney Failure <15      Lipase [[id number]]  (Abnormal) Collected: 08/25/24 1826    Specimen: Blood Updated: 08/25/24 1908     Lipase 68 U/L     BNP [[id number]]  (Normal) Collected: 08/25/24 1826    Specimen: Blood Updated: 08/25/24 1904     proBNP 45.4 pg/mL     Narrative:      This assay is used as an aid in the diagnosis of individuals suspected of having heart failure. It can be used as an aid in the diagnosis of acute decompensated heart failure (ADHF) in patients presenting with signs and symptoms of ADHF to the emergency department (ED). In addition, NT-proBNP of <300 pg/mL indicates ADHF is not likely.    Age Range Result Interpretation  NT-proBNP Concentration (pg/mL:      <50             Positive            >450                   Gray                 300-450                    Negative             <300    50-75           Positive            >900                  Gray                300-900                  Negative            <300      >75             Positive            >1800                  Gray                300-1800                  Negative            <300    Magnesium  [[id number]]  (Normal) Collected: 08/25/24 1826    Specimen: Blood Updated: 08/25/24 1908     Magnesium 2.0 mg/dL     CBC Auto Differential [[id number]]  (Abnormal) Collected: 08/25/24 1826    Specimen: Blood Updated: 08/25/24 1841     WBC 8.20 10*3/mm3      RBC 5.06 10*6/mm3      Hemoglobin 14.2 g/dL      Hematocrit 43.3 %      MCV 85.6 fL      MCH 28.1 pg      MCHC 32.8 g/dL      RDW 13.5 %      RDW-SD 42.3 fl      MPV 10.5 fL      Platelets 284 10*3/mm3      Neutrophil % 56.7 %      Lymphocyte % 31.3 %      Monocyte % 7.2 %      Eosinophil % 2.9 %      Basophil % 1.7 %      Immature Grans % 0.2 %      Neutrophils, Absolute 4.64 10*3/mm3      Lymphocytes, Absolute 2.57 10*3/mm3      Monocytes, Absolute 0.59 10*3/mm3      Eosinophils, Absolute 0.24 10*3/mm3      Basophils, Absolute 0.14 10*3/mm3      Immature Grans, Absolute 0.02 10*3/mm3      nRBC 0.0 /100 WBC     High Sensitivity Troponin T 2Hr [[id number]]  (Normal) Collected: 08/25/24 2007    Specimen: Blood Updated: 08/25/24 2037     HS Troponin T 6 ng/L      Troponin T Delta 0 ng/L     Narrative:      High Sensitive Troponin T Reference Range:  <14.0 ng/L- Negative Female for AMI  <22.0 ng/L- Negative Male for AMI  >=14 - Abnormal Female indicating possible myocardial injury.  >=22 - Abnormal Male indicating possible myocardial injury.   Clinicians would have to utilize clinical acumen, EKG, Troponin, and serial changes to determine if it is an Acute Myocardial Infarction or myocardial injury due to an underlying chronic condition.                  Imaging:    XR Chest 1 View    Result Date: 8/25/2024  XR CHEST 1 VW Date of Exam: 8/25/2024 6:45 PM EDT Indication: Chest Pain Triage Protocol Comparison: Chest radiograph 4/29/2024 Findings: Mediastinum: Cardiomediastinal silhouette appears unchanged. Lungs: The lungs are clear. Pleura: No pleural effusion or pneumothorax. Bones and soft tissues: No acute osseous abnormality.     Impression: No acute intrathoracic  finding. Electronically Signed: Surendra Gil  8/25/2024 7:12 PM EDT  Workstation ID: [id number]       Differential Diagnosis and Discussion:    Chest Pain:  Based on the patient's signs and symptoms, I considered aortic dissection, myocardial infaction, pulmonary embolism, cardiac tamponade, pericarditis, pneumothorax, musculoskeletal chest pain and other differential diagnosis as an etiology of the patient's chest pain.     All labs were reviewed and interpreted by me.  All X-rays impressions were independently interpreted by me.  EKG was interpreted by me.    MDM     Amount and/or Complexity of Data Reviewed  Clinical lab tests: reviewed  Tests in the radiology section of CPT®: reviewed  Tests in the medicine section of CPT®: reviewed       The patient had an EKG that shows no acute changes. Specifically, there are no ST elevations, t-wave changes of concern, delta waves, or rhythm abnormalities warranting admission. The patient was placed on the cardiac monitor and observed with continuous telemetry. The patient has a chest x-ray interpreted by me that is negative for pneumothorax, pneumonia, and is essentially unremarkable. The patient has had unelevated troponins on blood draw.      The patient is resting comfortably and feels better, is alert and in no distress.  The patient´s CBC that was reviewed and interpreted by me shows no abnormalities of critical concern. Of note, there is no anemia requiring a blood transfusion and the platelet count is acceptable.  The patient´s CMP that was reviewed and interpretted by me shows no abnormalities of critical concern. Of note, the patient´s sodium and potassium are acceptable. The patient´s liver enzymes are unremarkable. The patient´s renal function (creatinine) is preserved. The patient has a normal anion gap.  Troponin x 2 is negative.  The repeat examination is unremarkable and benign. Electrocardiogram shows no signs of acute ischemia and the history, exam,  diagnostic testing and current condition did not suggest that this patient is having an acute myocardial infarction, significant arrhythmia, unstable angina, esophageal perforation, pulmonary embolism, aortic dissection, severe pneumonia, sepsis for other significant pathology that would warrant further testing, continued ED treatment, admission, cardiology or other specialist consultation at this point. The vital signs have been stable. The patient's condition is stable and appropriate for discharge. The patient will pursue further outpatient evaluation with the primary care physician, or designated physician or cardiologist. The patient has expressed a clear and thorough understanding and agreed to follow-up as instructed.          Patient Care Considerations:    PERC: I used the PERC score to risk stratify the patient for PE and a CT of the chest was considered but ultimately not indicated in today's visit.      Consultants/Shared Management Plan:    None    Social Determinants of Health:    Patient is independent, reliable, and has access to care.       Disposition and Care Coordination:    Discharged: I considered escalation of care by admitting this patient to the hospital, however cardiac workup is negative.    I have explained the patient´s condition, diagnoses and treatment plan based on the information available to me at this time. I have answered questions and addressed any concerns. The patient has a good  understanding of the patient´s diagnosis, condition, and treatment plan as can be expected at this point. The vital signs have been stable. The patient´s condition is stable and appropriate for discharge from the emergency department.      The patient will pursue further outpatient evaluation with the primary care physician or other designated or consulting physician as outlined in the discharge instructions. They are agreeable to this plan of care and follow-up instructions have been explained in  detail. The patient has received these instructions in written format and has expressed an understanding of the discharge instructions. The patient is aware that any significant change in condition or worsening of symptoms should prompt an immediate return to this or the closest emergency department or call to 911.  I have explained discharge medications and the need for follow up with the patient/caretakers. This was also printed in the discharge instructions. Patient was discharged with the following medications and follow up:      Medication List        New Prescriptions      ketorolac 10 MG tablet  Commonly known as: TORADOL  Take 1 tablet by mouth Every 6 (Six) Hours As Needed for Moderate Pain.               Where to Get Your Medications        These medications were sent to [facility]/pharmacy #[id number] - [place], [place] - [address] - [phone] [facility] [phone] FX  [address] [place] [address]      Hours: 24-hours Phone: [phone]   ketorolac 10 MG tablet      Cassandra Cassidy, APRN  [address]   [address] [address] [phone]    In 2 days         Final diagnoses:   Pleurisy        ED Disposition       ED Disposition   Discharge    Condition   Stable    Comment   --               This medical record created using voice recognition software.             Faby Dominguez MD  08/25/24 6221

## 2024-09-06 LAB
QT INTERVAL: 360 MS
QT INTERVAL: 370 MS
QTC INTERVAL: 416 MS
QTC INTERVAL: 439 MS

## 2024-09-10 ENCOUNTER — OFFICE VISIT (OUTPATIENT)
Dept: DIABETES SERVICES | Facility: HOSPITAL | Age: 54
End: 2024-09-10
Payer: COMMERCIAL

## 2024-09-10 ENCOUNTER — TELEPHONE (OUTPATIENT)
Dept: DIABETES SERVICES | Facility: HOSPITAL | Age: 54
End: 2024-09-10

## 2024-09-10 VITALS
HEART RATE: 71 BPM | HEIGHT: 72 IN | DIASTOLIC BLOOD PRESSURE: 64 MMHG | OXYGEN SATURATION: 97 % | SYSTOLIC BLOOD PRESSURE: 118 MMHG | BODY MASS INDEX: 33.29 KG/M2 | WEIGHT: 245.8 LBS

## 2024-09-10 DIAGNOSIS — E11.65 TYPE 2 DIABETES MELLITUS WITH HYPERGLYCEMIA, WITHOUT LONG-TERM CURRENT USE OF INSULIN: Primary | ICD-10-CM

## 2024-09-10 DIAGNOSIS — E11.40 TYPE 2 DIABETES MELLITUS WITH DIABETIC NEUROPATHY, WITHOUT LONG-TERM CURRENT USE OF INSULIN: ICD-10-CM

## 2024-09-10 DIAGNOSIS — E66.9 OBESITY (BMI 30-39.9): ICD-10-CM

## 2024-09-10 DIAGNOSIS — E11.65 UNCONTROLLED TYPE 2 DIABETES MELLITUS WITH HYPERGLYCEMIA: ICD-10-CM

## 2024-09-10 LAB
ALBUMIN UR-MCNC: <1.2 MG/DL
CREAT UR-MCNC: 125.6 MG/DL
EXPIRATION DATE: ABNORMAL
GLUCOSE BLDC GLUCOMTR-MCNC: 157 MG/DL (ref 70–99)
HBA1C MFR BLD: 7.7 % (ref 4.5–5.7)
Lab: ABNORMAL
MICROALBUMIN/CREAT UR: NORMAL MG/G{CREAT}

## 2024-09-10 PROCEDURE — 82043 UR ALBUMIN QUANTITATIVE: CPT | Performed by: NURSE PRACTITIONER

## 2024-09-10 PROCEDURE — 3078F DIAST BP <80 MM HG: CPT | Performed by: NURSE PRACTITIONER

## 2024-09-10 PROCEDURE — 1159F MED LIST DOCD IN RCRD: CPT | Performed by: NURSE PRACTITIONER

## 2024-09-10 PROCEDURE — 3051F HG A1C>EQUAL 7.0%<8.0%: CPT | Performed by: NURSE PRACTITIONER

## 2024-09-10 PROCEDURE — 83036 HEMOGLOBIN GLYCOSYLATED A1C: CPT | Performed by: NURSE PRACTITIONER

## 2024-09-10 PROCEDURE — 1160F RVW MEDS BY RX/DR IN RCRD: CPT | Performed by: NURSE PRACTITIONER

## 2024-09-10 PROCEDURE — G0463 HOSPITAL OUTPT CLINIC VISIT: HCPCS | Performed by: NURSE PRACTITIONER

## 2024-09-10 PROCEDURE — 82948 REAGENT STRIP/BLOOD GLUCOSE: CPT | Performed by: NURSE PRACTITIONER

## 2024-09-10 PROCEDURE — 3074F SYST BP LT 130 MM HG: CPT | Performed by: NURSE PRACTITIONER

## 2024-09-10 PROCEDURE — 82570 ASSAY OF URINE CREATININE: CPT | Performed by: NURSE PRACTITIONER

## 2024-09-10 PROCEDURE — 99214 OFFICE O/P EST MOD 30 MIN: CPT | Performed by: NURSE PRACTITIONER

## 2024-09-10 RX ORDER — BLOOD-GLUCOSE SENSOR
2 EACH MISCELLANEOUS
Qty: 2 EACH | Refills: 5 | Status: SHIPPED | OUTPATIENT
Start: 2024-09-10

## 2024-09-10 RX ORDER — GLIMEPIRIDE 4 MG/1
1 TABLET ORAL
COMMUNITY
Start: 2024-06-20 | End: 2024-09-10 | Stop reason: SDUPTHER

## 2024-09-10 RX ORDER — VARENICLINE TARTRATE 0.5 (11)-1
0.5 KIT ORAL DAILY
COMMUNITY
Start: 2024-09-10 | End: 2024-12-09

## 2024-09-10 RX ORDER — GLIMEPIRIDE 4 MG/1
4 TABLET ORAL
Qty: 90 TABLET | Refills: 1 | Status: SHIPPED | OUTPATIENT
Start: 2024-09-10

## 2024-09-10 RX ORDER — DAPAGLIFLOZIN 10 MG/1
10 TABLET, FILM COATED ORAL DAILY
Qty: 90 TABLET | Refills: 1 | Status: SHIPPED | OUTPATIENT
Start: 2024-09-10 | End: 2025-03-09

## 2024-09-10 RX ORDER — BLOOD-GLUCOSE SENSOR
1 EACH MISCELLANEOUS
Qty: 2 EACH | Refills: 5 | Status: SHIPPED | OUTPATIENT
Start: 2024-09-10 | End: 2024-09-10

## 2024-09-10 NOTE — TELEPHONE ENCOUNTER
Last OV 09/10/2024    Next scheduled appointment 12/11/2024    THE FREE STYLE CRISTHIAN SENSORS ARE NOT MADE ANYMORE . STATED THE NEW MEDICATION / SCRIPT SHOULD BE FREE STYLE CRISTHIAN 3 PLUS SENSORS     Needs new script for Cristhian 3 plus sent to [facility] pharmacy

## 2024-09-10 NOTE — TELEPHONE ENCOUNTER
Caller: [facility] [id number] - [place] - [address] AT [facility] [address] - [phone] [facility] [phone] FX    Relationship: Pharmacy    Best call back number: [phone]    Requested Prescriptions:  Continuous Glucose Sensor (FreeStyle Jonathan 3 Sensor) 3 PLUS SENSORS  Requested Prescriptions      No prescriptions requested or ordered in this encounter        Pharmacy where request should be sent:    [facility] [id number] [place] - [address] AT [facility] [address] - [phone]  - [phone] [address] [place] [address]      Last office visit with prescribing clinician: 9/10/2024   Last telemedicine visit with prescribing clinician: Visit date not found   Next office visit with prescribing clinician: 12/11/2024     Additional details provided by patient: THE FREE STYLE JONATHAN SENSORS ARE NOT MADE ANYMORE . STATED THE NEW MEDICATION / SCRIPT SHOULD BE FREE STYLE JONATHAN 3 PLUS SENSORS    Does the patient have less than a 3 day supply:  [x] Yes  [] No    Would you like a call back once the refill request has been completed: [x] Yes [] No    If the office needs to give you a call back, can they leave a voicemail: [x] Yes [] No    Valerio Castano Rep   09/10/24 16:52 EDT

## 2024-09-10 NOTE — PROGRESS NOTES
Chief Complaint  Diabetes (Follow up, med mgt, CGM eval?, A1c eval Not wearing CGM)    Referred By: PAOLO Mae presents to [facility] for diabetes medication management    History of Present Illness    Visit type:  follow-up  Diabetes type:  Type 2  Current diabetes status/concerns/issues: Reports he has been off all his meds x 2 months. States he thought he did not need the meds he was prescribed. States he has a new girlfriend and she is trying to motivate him to get his health under control. States she is a nurse and she wants him to get his blood sugar under control and wants him to quit smoking. State he figured out what he he was doing ot make his blood sugar so high-admits he was drinking 6-7 cokes day and he switched to coke zero.  Admits he quit smoking last night. His PCP prescribed Chantix. Admits he drank 3 energy drinks today due to quitting smoking. Reports he has not been checking his blood sugar.   Other health concerns:  he is going   Current Diabetes symptoms:    Polyuria: No   Polydipsia: Yes     Polyphagia: No   Blurred vision: No   Excessive fatigue: No  Known Diabetes complications:  Neuropathy: Numbness and Tingling; Location: Feet  Renal: None  Eyes: No current eye exam available in record; Location: N/A; Last Eye Exam:  over a year ago ; Location:   Amputation/Wounds: None  GI: Constipation, Diarrhea, Reflux, and Indigestion  Cardiovascular: Hypertension and Hyperlipidemia  ED: Patient Reported  Other: None  Hypoglycemia:  None reported at this time  Hypoglycemia Symptoms:  No hypoglycemia at this time  Current diabetes treatment:    metformin 500mg bid, amaryl 2mg qd, farxiga 10mg qd   Blood glucose device:  Jonathan CGM and Not currently monitoring BG  Blood glucose monitoring frequency:  Not currently monitoring BG  Blood glucose range/average:  Not currently monitoring BG  Glucose Source:  N/A  Dietary behavior:  Limits high carb/sweet foods, Avoids sugary drinks, Number of meals each day - 3; Number of snacks each day - 2. States he is now eating in moderation  Activity/Exercise:  None    Past Medical History:   Diagnosis Date    Allergic     Anxiety     Colon polyp     Depression     Diabetes mellitus     DOESN'T CHECK BG AT HOME    Diverticulosis     Hernia of abdominal wall     Hyperlipidemia     Hypertension     Migraine     Pancreatitis     Prostatitis     Shortness of breath     DENIED ANY CURRENTLY    Sinusitis     Umbilical hernia     Urinary tract infection     NO CURRENT PROBLEMS     Past Surgical History:   Procedure Laterality Date    APPENDECTOMY      CHOLECYSTECTOMY      COLON SURGERY      R/T DIVERTICULITIS    HEMORRHOIDECTOMY      HERNIA REPAIR      X2    MANDIBLE FRACTURE SURGERY      TONSILLECTOMY      UMBILICAL HERNIA REPAIR N/A 4/20/2022    Procedure: UMBILICAL HERNIA REPAIR LAPAROSCOPIC;  Surgeon: Tad Ortiz MD;  Location: [facility] OR;  Service: General;  Laterality: N/A;     Family History   Adopted: Yes   Family history unknown: Yes     Social History     Socioeconomic History    Marital status:    Tobacco Use    Smoking status: Former     Current packs/day: 0.25     Types: Cigarettes     Passive exposure: Never    Smokeless tobacco: Never    Tobacco comments:     'quit last year' - smoked 30+ years - INSTRUCTED NO SMOKING 24 HR PRIOR TO SURGERY   Vaping Use    Vaping status: Every Day    Substances: Nicotine    Devices: Disposable   Substance and Sexual Activity    Alcohol use: Not Currently     Comment: soc     Drug use: No    Sexual activity: Yes     Partners: Female     Birth control/protection: None     No Known Allergies    Current Outpatient Medications:     atorvastatin (LIPITOR) 20 MG tablet, Take 1 tablet by mouth Daily., Disp: , Rfl:     busPIRone (BUSPAR) 10 MG tablet, Take 1 tablet by mouth 3 (Three) Times a Day., Disp: , Rfl:     dapagliflozin  "Propanediol 10 MG tablet, Take 10 mg by mouth Daily for 180 days., Disp: 90 tablet, Rfl: 1    glimepiride (AMARYL) 4 MG tablet, Take 1 tablet by mouth Every Morning Before Breakfast., Disp: 90 tablet, Rfl: 1    levocetirizine (XYZAL) 5 MG tablet, Take 1 tablet by mouth Daily., Disp: , Rfl:     lisinopril (PRINIVIL,ZESTRIL) 5 MG tablet, Take 1 tablet by mouth Daily., Disp: , Rfl:     metFORMIN (GLUCOPHAGE) 500 MG tablet, Take 1 tablet by mouth 2 (Two) Times a Day With Meals for 180 days., Disp: 180 tablet, Rfl: 1    OXcarbazepine (TRILEPTAL) 300 MG tablet, Take 1 tablet by mouth 2 (Two) Times a Day., Disp: , Rfl:     Varenicline Tartrate, Starter, 0.5 MG X 11 & 1 MG X 42 tablet therapy pack, Place 0.5 mg on the skin as directed by provider Daily., Disp: , Rfl:     vitamin D (ERGOCALCIFEROL) 1.25 MG (09239 UT) capsule capsule, , Disp: , Rfl:     Accu-Chek Crystal Plus test strip, Check glucose daily in the morning. (Patient not taking: Reported on 9/10/2024), Disp: 100 each, Rfl: 5    Blood Glucose Monitoring Suppl (ACCU-CHEK CRYTSAL PLUS) w/Device kit, Check glucose daily in the morning. (Patient not taking: Reported on 9/10/2024), Disp: 1 kit, Rfl: 0    Continuous Glucose Sensor (FreeStyle Jonathan 3 Plus Sensor) misc, Use 2 each Every 30 (Thirty) Days. Apply as directed every 15 days, Disp: 2 each, Rfl: 5    Lancets (accu-chek multiclix) lancets, Check glucose daily in the morning. (Patient not taking: Reported on 9/10/2024), Disp: 100 each, Rfl: 5    omeprazole (priLOSEC) 40 MG capsule, Take 1 capsule by mouth Daily. (Patient not taking: Reported on 6/12/2024), Disp: , Rfl:     Objective     Vitals:    09/10/24 1609   BP: 118/64   Pulse: 71   SpO2: 97%   Weight: 111 kg (245 lb 12.8 oz)   Height: 182.9 cm (72\")   PainSc: 0-No pain     Body mass index is 33.34 kg/m².    Physical Exam  Constitutional:       Appearance: Normal appearance. He is normal weight. He is obese.      Comments: Obesity (BMI 30 - 39.9) Pt Current BMI " = 33.34     HENT:      Head: Normocephalic and atraumatic.      Right Ear: External ear normal.      Left Ear: External ear normal.      Nose: Nose normal.   Eyes:      Extraocular Movements: Extraocular movements intact.      Conjunctiva/sclera: Conjunctivae normal.   Pulmonary:      Effort: Pulmonary effort is normal.   Musculoskeletal:         General: Normal range of motion.      Cervical back: Normal range of motion.   Skin:     General: Skin is warm and dry.   Neurological:      General: No focal deficit present.      Mental Status: He is alert and oriented to person, place, and time. Mental status is at baseline.   Psychiatric:         Mood and Affect: Mood normal.         Behavior: Behavior normal.         Thought Content: Thought content normal.         Judgment: Judgment normal.             Result Review :   The following data was reviewed by: PAOLO Tovar on 09/10/2024:    Most Recent A1C          9/10/2024    16:24   HGBA1C Most Recent   Hemoglobin A1C 7.7        A1C Last 3 Results          3/21/2024    09:09 6/12/2024    15:33 9/10/2024    16:24   HGBA1C Last 3 Results   Hemoglobin A1C 8.6  7.2  7.7      A1c collected in the office today is 7.7%, indicating Uncontrolled Type II diabetes.  This result is up from the prior result of 7.2% collected on 6/12/24     Glucose   Date Value Ref Range Status   09/10/2024 157 (H) 70 - 99 mg/dL Final     Comment:     Serial Number: [id number]:  [id number]     Point of care glucose in the office today is within normal limits for nonfasting glucose    Creatinine   Date Value Ref Range Status   08/25/2024 1.15 0.76 - 1.27 mg/dL Final   04/29/2024 0.93 0.76 - 1.27 mg/dL Final   04/12/2022 0.99 0.73 - 1.18 mg/dL Final   12/02/2021 1.16 0.73 - 1.18 mg/dL Final     eGFR   Date Value Ref Range Status   08/25/2024 75.6 >60.0 mL/min/1.73 Final   04/29/2024 97.6 >60.0 mL/min/1.73 Final     Labs collected on 8/25/24 show Stage II mild (GFR =  60-89mL/min)                Assessment: Pt is here today for a 3 month follow up on his diabetes.Reports he has been off all his meds x 2 months. States he thought he did not need the meds he was prescribed. States he has a new girlfriend and she is trying to motivate him to get his health under control. States she is a nurse and she wants him to get his blood sugar under control and wants him to quit smoking. State he figured out what he he was doing to make his blood sugar so high-admits he was drinking 6-7 cokes day and he switched to coke zero.  Admits he quit smoking last night. His PCP prescribed Chantix. Admits he drank 3 energy drinks today due to cravings from quitting smoking. Reports he has not been checking his blood sugar. His A1c in the office today is 7.7% which is up from his previous A1c of 7.2%      Diagnoses and all orders for this visit:    1. Type 2 diabetes mellitus with hyperglycemia, without long-term current use of insulin (Primary)  -     POC Glycosylated Hemoglobin (Hb A1C)  -     Microalbumin / Creatinine Urine Ratio - Urine, Clean Catch; Future  -     Microalbumin / Creatinine Urine Ratio - Urine, Clean Catch  -     dapagliflozin Propanediol 10 MG tablet; Take 10 mg by mouth Daily for 180 days.  Dispense: 90 tablet; Refill: 1  -     metFORMIN (GLUCOPHAGE) 500 MG tablet; Take 1 tablet by mouth 2 (Two) Times a Day With Meals for 180 days.  Dispense: 180 tablet; Refill: 1  -     Discontinue: Continuous Glucose Sensor (FreeStyle Jonathan 3 Sensor) misc; Use 1 each Every 14 (Fourteen) Days.  Dispense: 2 each; Refill: 5    2. Uncontrolled type 2 diabetes mellitus with hyperglycemia    3. Type 2 diabetes mellitus with diabetic neuropathy, without long-term current use of insulin    4. Obesity (BMI 30-39.9)    Other orders  -     POC Glucose  -     glimepiride (AMARYL) 4 MG tablet; Take 1 tablet by mouth Every Morning Before Breakfast.  Dispense: 90 tablet; Refill: 1        Plan: Instructed pt to  restart his diabetic medications as previously prescribed for improved glycemic control. Instructed to restart his CGM for closer monitoring of his glucose levels. Discussed the complications associated with uncontrolled diabetes to include MI, CVA, renal failure, blindness and lower limb amputations. Scheduled a 3 month follow up and we will recheck his A1c at that time.     The patient will monitor his blood glucose levels per CGM.  If he develops problematic hyperglycemia or hypoglycemia or adverse drug reactions, he will contact the office for further instructions.        Follow Up     Return in about 3 months (around 12/10/2024) for Medication Mgmt, CGM Follow Up.    Patient was given instructions and counseling regarding his condition or for health maintenance advice. Please see specific information pulled into the AVS if appropriate.     Naya Ca, PAOLO  09/10/2024      Dictated Utilizing Dragon Dictation.  Please note that portions of this note were completed with a voice recognition program.  Part of this note may be an electronic transcription/translation of spoken language to printed text using the Dragon Dictation System.

## 2024-09-12 NOTE — TELEPHONE ENCOUNTER
Called patient instructed patient prescription was sent in for new Jonathan 3 plus sensors. If he has any other problems or concerns he is to call the office back

## 2024-09-13 ENCOUNTER — PRIOR AUTHORIZATION (OUTPATIENT)
Dept: DIABETES SERVICES | Facility: HOSPITAL | Age: 54
End: 2024-09-13
Payer: COMMERCIAL

## 2024-09-13 NOTE — TELEPHONE ENCOUNTER
Rupert Briscoe (West: [id number])    \This request has been approved.    Please note any additional information provided by [email or web address] at the bottom of your screen.    Rupert Briscoe ([place]: [id number])  PA Case ID #: [id number]  Rx #: [id number]    FreeStyle Jonathan 3 Plus Sensor    Approved today by [place] [employer] 2017  The request has been approved. The authorization is effective from 09/13/2024 to 09/13/2025, as long as the member is enrolled in their current health plan. A written notification letter will follow with additional details.  Authorization Expiration Date: 9/12/2025

## 2024-11-08 ENCOUNTER — TELEPHONE (OUTPATIENT)
Dept: DIABETES SERVICES | Facility: HOSPITAL | Age: 54
End: 2024-11-08
Payer: COMMERCIAL

## 2025-05-21 ENCOUNTER — TELEPHONE (OUTPATIENT)
Dept: UROLOGY | Age: 55
End: 2025-05-21
Payer: COMMERCIAL

## 2025-05-21 NOTE — TELEPHONE ENCOUNTER
Caller: DAMIAN    Relationship: [facility]    Best call back number: [phone]     What form or medical record are you requesting: PROG NOTE    Who is requesting this form or medical record from you: [facility]    How would you like to receive the form or medical records (pick-up, mail, fax): FAX  If fax, what is the fax number: [phone]       Timeframe paperwork needed: ASAP    Additional notes: REQUESTING LAST PROG NOTE FROM 5-24-23

## (undated) DEVICE — BLUNT TIP LAPAROSCOPIC SEALER/DIVIDER NANO-COATED: Brand: LIGASURE

## (undated) DEVICE — ENDOPATH XCEL WITH OPTIVIEW TECHNOLOGY BLADELESS TROCARS WITH STABILITY SLEEVES: Brand: ENDOPATH XCEL OPTIVIEW

## (undated) DEVICE — DRSNG SURESITE WNDW 4X4.5

## (undated) DEVICE — INSUFFLATION NEEDLE TO ESTABLISH PNEUMOPERITONEUM.: Brand: INSUFFLATION NEEDLE

## (undated) DEVICE — 30977 SEE SHARP - ENHANCED INTRAOPERATIVE LAPAROSCOPE CLEANING & DEFOGGING: Brand: 30977 SEE SHARP - ENHANCED INTRAOPERATIVE LAPAROSCOPE CLEANING & DEFOGGING

## (undated) DEVICE — SLV SCD KN/LEN ADJ EXPRSS BLENDED MD 1P/U

## (undated) DEVICE — SUT MERSILENE POLYSTR CT1 BR 0 75CM GRN

## (undated) DEVICE — PENCL E/S SMOKEEVAC W/TELESCP CANN

## (undated) DEVICE — NON-WOVEN ADHESIVE WOUND DRESSING: Brand: PRIMAPORE ADHESIVE DRESSING 10*8CM

## (undated) DEVICE — INTENDED FOR TISSUE SEPARATION, AND OTHER PROCEDURES THAT REQUIRE A SHARP SURGICAL BLADE TO PUNCTURE OR CUT.: Brand: BARD-PARKER ® CARBON RIB-BACK BLADES

## (undated) DEVICE — ANTIBACTERIAL UNDYED BRAIDED (POLYGLACTIN 910), SYNTHETIC ABSORBABLE SUTURE: Brand: COATED VICRYL

## (undated) DEVICE — GOWN,REINFRCE,POLY,SIRUS,BREATH SLV,XXLG: Brand: MEDLINE

## (undated) DEVICE — GENERAL LAPAROSCOPY-LF: Brand: MEDLINE INDUSTRIES, INC.

## (undated) DEVICE — SYS CLOSE PORTII CARTR/THOMASN XL

## (undated) DEVICE — 3M™ STERI-STRIP™ REINFORCED ADHESIVE SKIN CLOSURES, R1547, 1/2 IN X 4 IN (12 MM X 100 MM), 6 STRIPS/ENVELOPE: Brand: 3M™ STERI-STRIP™

## (undated) DEVICE — GLV SURG SENSICARE PI ORTHO SZ8 LF STRL

## (undated) DEVICE — STERILE POLYISOPRENE POWDER-FREE SURGICAL GLOVES WITH EMOLLIENT COATING: Brand: PROTEXIS